# Patient Record
Sex: FEMALE | Race: WHITE | NOT HISPANIC OR LATINO | Employment: OTHER | URBAN - METROPOLITAN AREA
[De-identification: names, ages, dates, MRNs, and addresses within clinical notes are randomized per-mention and may not be internally consistent; named-entity substitution may affect disease eponyms.]

---

## 2023-03-24 ENCOUNTER — TELEMEDICINE (OUTPATIENT)
Dept: PRIMARY CARE | Facility: CLINIC | Age: 83
End: 2023-03-24
Payer: MEDICARE

## 2023-03-24 DIAGNOSIS — U07.1 COVID-19: Primary | ICD-10-CM

## 2023-03-24 PROBLEM — E66.9 CLASS 1 OBESITY WITH BODY MASS INDEX (BMI) OF 31.0 TO 31.9 IN ADULT: Status: ACTIVE | Noted: 2023-03-24

## 2023-03-24 PROBLEM — E03.9 HYPOTHYROIDISM: Status: ACTIVE | Noted: 2023-03-24

## 2023-03-24 PROBLEM — K21.9 GERD (GASTROESOPHAGEAL REFLUX DISEASE): Status: ACTIVE | Noted: 2023-03-24

## 2023-03-24 PROBLEM — F43.9 STRESS: Status: ACTIVE | Noted: 2023-03-24

## 2023-03-24 PROBLEM — F43.21 ADJUSTMENT DISORDER WITH DEPRESSED MOOD: Status: ACTIVE | Noted: 2023-03-24

## 2023-03-24 PROBLEM — M54.50 LUMBAGO: Status: ACTIVE | Noted: 2023-03-24

## 2023-03-24 PROBLEM — E66.811 CLASS 1 OBESITY WITH BODY MASS INDEX (BMI) OF 31.0 TO 31.9 IN ADULT: Status: ACTIVE | Noted: 2023-03-24

## 2023-03-24 PROBLEM — M25.561 BILATERAL KNEE PAIN: Status: ACTIVE | Noted: 2023-03-24

## 2023-03-24 PROBLEM — R42 EPISODIC LIGHTHEADEDNESS: Status: ACTIVE | Noted: 2023-03-24

## 2023-03-24 PROBLEM — R39.9 UTI SYMPTOMS: Status: ACTIVE | Noted: 2023-03-24

## 2023-03-24 PROBLEM — E86.0 DEHYDRATION: Status: ACTIVE | Noted: 2023-03-24

## 2023-03-24 PROBLEM — R39.15 URGENCY OF URINATION: Status: ACTIVE | Noted: 2023-03-24

## 2023-03-24 PROBLEM — E11.40 DIABETIC NEUROPATHY (MULTI): Status: ACTIVE | Noted: 2023-03-24

## 2023-03-24 PROBLEM — I10 HYPERTENSION: Status: ACTIVE | Noted: 2023-03-24

## 2023-03-24 PROBLEM — M19.90 OSTEOARTHRITIS: Status: ACTIVE | Noted: 2023-03-24

## 2023-03-24 PROBLEM — E78.5 HYPERLIPIDEMIA: Status: ACTIVE | Noted: 2023-03-24

## 2023-03-24 PROBLEM — N95.2 ATROPHIC VAGINITIS: Status: ACTIVE | Noted: 2023-03-24

## 2023-03-24 PROBLEM — K59.00 CONSTIPATION: Status: ACTIVE | Noted: 2023-03-24

## 2023-03-24 PROBLEM — M25.562 BILATERAL KNEE PAIN: Status: ACTIVE | Noted: 2023-03-24

## 2023-03-24 PROBLEM — M19.90 DEGENERATIVE JOINT DISEASE: Status: ACTIVE | Noted: 2023-03-24

## 2023-03-24 PROBLEM — E11.9 TYPE 2 DIABETES MELLITUS (MULTI): Status: ACTIVE | Noted: 2023-03-24

## 2023-03-24 PROBLEM — N39.0 URINARY TRACT INFECTION, CHRONIC: Status: ACTIVE | Noted: 2023-03-24

## 2023-03-24 PROBLEM — I48.91 ATRIAL FIBRILLATION (MULTI): Status: ACTIVE | Noted: 2023-03-24

## 2023-03-24 PROBLEM — N39.0 UTI (URINARY TRACT INFECTION): Status: ACTIVE | Noted: 2023-03-24

## 2023-03-24 PROCEDURE — 99213 OFFICE O/P EST LOW 20 MIN: CPT | Performed by: FAMILY MEDICINE

## 2023-03-24 RX ORDER — BROMPHENIRAMINE MALEATE, PSEUDOEPHEDRINE HYDROCHLORIDE, AND DEXTROMETHORPHAN HYDROBROMIDE 2; 30; 10 MG/5ML; MG/5ML; MG/5ML
5 SYRUP ORAL 4 TIMES DAILY PRN
Qty: 120 ML | Refills: 0 | Status: SHIPPED | OUTPATIENT
Start: 2023-03-24 | End: 2023-04-03

## 2023-03-24 RX ORDER — ATORVASTATIN CALCIUM 40 MG/1
1 TABLET, FILM COATED ORAL DAILY
COMMUNITY
Start: 2014-05-23

## 2023-03-24 RX ORDER — INSULIN LISPRO 100 [IU]/ML
20 INJECTION, SUSPENSION SUBCUTANEOUS 2 TIMES DAILY
COMMUNITY
Start: 2014-01-08

## 2023-03-24 RX ORDER — GABAPENTIN 100 MG/1
1 CAPSULE ORAL NIGHTLY
COMMUNITY
Start: 2022-04-18 | End: 2023-08-22

## 2023-03-24 RX ORDER — MOLNUPIRAVIR 200 MG/1
400 CAPSULE ORAL 2 TIMES DAILY
Qty: 40 CAPSULE | Refills: 0 | Status: SHIPPED | OUTPATIENT
Start: 2023-03-24 | End: 2023-08-22

## 2023-03-24 RX ORDER — LEVOTHYROXINE SODIUM 75 UG/1
75 TABLET ORAL DAILY
COMMUNITY
Start: 2015-12-04

## 2023-03-24 RX ORDER — CHOLECALCIFEROL (VITAMIN D3) 25 MCG
TABLET ORAL
COMMUNITY
Start: 2018-12-26

## 2023-03-24 RX ORDER — OMEPRAZOLE 20 MG/1
1 CAPSULE, DELAYED RELEASE ORAL DAILY
COMMUNITY
Start: 2013-09-17

## 2023-03-24 ASSESSMENT — ENCOUNTER SYMPTOMS
DEPRESSION: 0
OCCASIONAL FEELINGS OF UNSTEADINESS: 0
LOSS OF SENSATION IN FEET: 0

## 2023-03-24 NOTE — PROGRESS NOTES
Subjective   Patient ID: Anabel Cordova is a 83 y.o. female who presents for No chief complaint on file..  HPI  Very pleasant 83-year-old with history of diabetes hyperlipidemia and DVT atrial fibrillation here today virtually seen virtually today for COVID-19 she tested positive this morning her symptoms started 2 days ago she has a severe cough no fever she is able to speak without feeling short of breath she has multiple medical issues her family was concerned as she has not had a fever chills has not had body aches did have vomit this morning I had a little bit of diarrhea little bit of body aches for the most part when she rests she feels okay  Review of Systems  Constitutional: no chills, no fever and no night sweats.   Eyes: no blurred vision and no eyesight problems.   ENT: no hearing loss, no nasal congestion, no nasal discharge, no hoarseness and no sore throat.   Cardiovascular: no chest pain, no intermittent leg claudication, no lower extremity edema, no palpitations and no syncope.   Respiratory: no cough, no shortness of breath during exertion, no shortness of breath at rest and no wheezing.   Gastrointestinal: no abdominal pain, no blood in stools, no constipation, no diarrhea, no melena, no nausea, no rectal pain and no vomiting.   Genitourinary: no dysuria, no change in urinary frequency, no urinary hesitancy, no feelings of urinary urgency and no vaginal discharge.   Musculoskeletal: no arthralgias,  no back pain and no myalgias.   Integumentary: no new skin lesions and no rashes.   Neurological: no difficulty walking, no headache, no limb weakness, no numbness and no tingling.   Psychiatric: no anxiety, no depression, no anhedonia and no substance use disorders.   Endocrine: no recent weight gain and no recent weight loss.   Hematologic/Lymphatic: no tendency for easy bruising and no swollen glands .  Objective    There were no vitals taken for this visit.   Physical Exam  The patient  appears well and is in no acute distress  There is no apparent accessory muscle use does not appear to have any retractions  No conversational dyspnea she does have a slight cough she is able to take deep breaths and she in general appears well and is smiling  Assessment/Plan   Problem List Items Addressed This Visit    None  Visit Diagnoses       COVID-19    -  Primary    Relevant Medications    nirmatrelvir-ritonavir (PAXLOVID) 150-100 mg tablet therapy pack    brompheniramine-pseudoeph-DM 2-30-10 mg/5 mL syrup            COVID-19  Paxlovid use cautiously hold on statin and temporarily hold Eliquis for 5 days hold statin for 10  Fluids rest symptomatic relief  Bromfed for cough  Close observation for foot and follow-up if symptoms worsen please follow-up      Nathalie Buchanan MD

## 2023-03-24 NOTE — PATIENT INSTRUCTIONS
You have COVID I am putting you on an antiviral that is experimental it should help shorten your symptoms and keep you out of the hospital but you have to avoid certain medications during the course of your therapy please do not take your statin and do not take the Eliquis for a few days 5 days  Please watch closely your symptoms if they worsen please let me know immediately  Please watch for worsening fever and declining respiratory status  Please follow-up in office if you are not better  Please schedule annual Medicare wellness exam

## 2023-03-28 ENCOUNTER — TELEPHONE (OUTPATIENT)
Dept: PRIMARY CARE | Facility: CLINIC | Age: 83
End: 2023-03-28
Payer: MEDICARE

## 2023-03-28 NOTE — TELEPHONE ENCOUNTER
Pt daughter raquel called and said that she started paxlovid on Saturday and took pepdo for stomach pain Sunday and did a double dose, she had dark stool on Monday and still today and was wondering if that should be concerning, please advice to raquel at [phone]

## 2023-03-29 ENCOUNTER — TELEPHONE (OUTPATIENT)
Dept: PRIMARY CARE | Facility: CLINIC | Age: 83
End: 2023-03-29
Payer: MEDICARE

## 2023-04-01 LAB
NON-UH HIE BASO COUNT: 0.01 X1000 (ref 0–0.2)
NON-UH HIE BASOS %: 0.2 %
NON-UH HIE DIFF?: NO
NON-UH HIE DXH ACTIONS: ABNORMAL
NON-UH HIE EOS COUNT: 0.02 X1000 (ref 0–0.5)
NON-UH HIE EOSIN %: 0.4 %
NON-UH HIE HCT: 40.5 % (ref 36–46)
NON-UH HIE HGB: 13.4 G/DL (ref 12–16)
NON-UH HIE INSTR WBC: 4.6
NON-UH HIE LYMPH %: 38 %
NON-UH HIE LYMPH COUNT: 1.75 X1000 (ref 1.2–4.8)
NON-UH HIE MCH: 29.9 PG (ref 27–34)
NON-UH HIE MCHC: 33.2 G/DL (ref 32–37)
NON-UH HIE MCV: 90.2 FL (ref 80–100)
NON-UH HIE MONO %: 6.2 %
NON-UH HIE MONO COUNT: 0.29 X1000 (ref 0.1–1)
NON-UH HIE MPV: 7.1 FL (ref 7.4–10.4)
NON-UH HIE NEUTROPHIL %: 55.3 %
NON-UH HIE NEUTROPHIL COUNT (ANC): 2.54 X1000 (ref 1.4–8.8)
NON-UH HIE NUCLEATED RBC: 0 /100WBC
NON-UH HIE PLATELET: 248 X10 (ref 150–450)
NON-UH HIE RBC: 4.49 X10 (ref 4.2–5.4)
NON-UH HIE RDW: 12.8 % (ref 11.5–14.5)
NON-UH HIE WBC: 4.6 X10 (ref 4.5–11)

## 2023-05-22 ENCOUNTER — OFFICE VISIT (OUTPATIENT)
Dept: PRIMARY CARE | Facility: CLINIC | Age: 83
End: 2023-05-22
Payer: MEDICARE

## 2023-05-22 DIAGNOSIS — E11.42 DIABETIC POLYNEUROPATHY ASSOCIATED WITH TYPE 2 DIABETES MELLITUS (MULTI): ICD-10-CM

## 2023-05-22 DIAGNOSIS — E66.9 CLASS 1 OBESITY WITH SERIOUS COMORBIDITY AND BODY MASS INDEX (BMI) OF 31.0 TO 31.9 IN ADULT, UNSPECIFIED OBESITY TYPE: ICD-10-CM

## 2023-05-22 DIAGNOSIS — Z00.00 MEDICARE ANNUAL WELLNESS VISIT, SUBSEQUENT: Primary | ICD-10-CM

## 2023-05-22 DIAGNOSIS — E11.9 TYPE 2 DIABETES MELLITUS WITHOUT COMPLICATION, WITHOUT LONG-TERM CURRENT USE OF INSULIN (MULTI): ICD-10-CM

## 2023-05-22 DIAGNOSIS — H60.93 OTITIS EXTERNA OF BOTH EARS, UNSPECIFIED CHRONICITY, UNSPECIFIED TYPE: ICD-10-CM

## 2023-05-22 DIAGNOSIS — J44.9 CHRONIC OBSTRUCTIVE PULMONARY DISEASE, UNSPECIFIED COPD TYPE (MULTI): ICD-10-CM

## 2023-05-22 DIAGNOSIS — D68.59 ANTITHROMBIN DEFICIENCY (MULTI): ICD-10-CM

## 2023-05-22 DIAGNOSIS — I20.89 ANGINA DECUBITUS (CMS-HCC): ICD-10-CM

## 2023-05-22 DIAGNOSIS — I48.11 LONGSTANDING PERSISTENT ATRIAL FIBRILLATION (MULTI): ICD-10-CM

## 2023-05-22 PROCEDURE — 3075F SYST BP GE 130 - 139MM HG: CPT | Performed by: FAMILY MEDICINE

## 2023-05-22 PROCEDURE — 1170F FXNL STATUS ASSESSED: CPT | Performed by: FAMILY MEDICINE

## 2023-05-22 PROCEDURE — 99213 OFFICE O/P EST LOW 20 MIN: CPT | Performed by: FAMILY MEDICINE

## 2023-05-22 PROCEDURE — 1159F MED LIST DOCD IN RCRD: CPT | Performed by: FAMILY MEDICINE

## 2023-05-22 PROCEDURE — 1160F RVW MEDS BY RX/DR IN RCRD: CPT | Performed by: FAMILY MEDICINE

## 2023-05-22 PROCEDURE — G0439 PPPS, SUBSEQ VISIT: HCPCS | Performed by: FAMILY MEDICINE

## 2023-05-22 PROCEDURE — 1036F TOBACCO NON-USER: CPT | Performed by: FAMILY MEDICINE

## 2023-05-22 PROCEDURE — 3078F DIAST BP <80 MM HG: CPT | Performed by: FAMILY MEDICINE

## 2023-05-22 RX ORDER — NEOMYCIN SULFATE, POLYMYXIN B SULFATE, HYDROCORTISONE 3.5; 10000; 1 MG/ML; [USP'U]/ML; MG/ML
2 SOLUTION/ DROPS AURICULAR (OTIC) 4 TIMES DAILY
Qty: 10 ML | Refills: 0 | Status: SHIPPED | OUTPATIENT
Start: 2023-05-22 | End: 2023-05-29

## 2023-05-22 ASSESSMENT — ACTIVITIES OF DAILY LIVING (ADL)
MANAGING_FINANCES: INDEPENDENT
BATHING: INDEPENDENT
DRESSING: INDEPENDENT
GROCERY_SHOPPING: NEEDS ASSISTANCE
TAKING_MEDICATION: INDEPENDENT
DOING_HOUSEWORK: INDEPENDENT

## 2023-05-22 ASSESSMENT — PATIENT HEALTH QUESTIONNAIRE - PHQ9
1. LITTLE INTEREST OR PLEASURE IN DOING THINGS: NOT AT ALL
SUM OF ALL RESPONSES TO PHQ9 QUESTIONS 1 AND 2: 0
1. LITTLE INTEREST OR PLEASURE IN DOING THINGS: NOT AT ALL
2. FEELING DOWN, DEPRESSED OR HOPELESS: NOT AT ALL
SUM OF ALL RESPONSES TO PHQ9 QUESTIONS 1 AND 2: 0
2. FEELING DOWN, DEPRESSED OR HOPELESS: NOT AT ALL

## 2023-05-22 NOTE — PROGRESS NOTES
Subjective   Reason for Visit: Anabel Cordova is an 83 y.o. female here for a Medicare Wellness visit.     Past Medical, Surgical, and Family History reviewed and updated in chart.    Reviewed all medications by prescribing practitioner or clinical pharmacist (such as prescriptions, OTCs, herbal therapies and supplements) and documented in the medical record.    Very pleasant 83-year-old here today with a feeling of blockage in both of her ears  Its been bothering her for several weeks and getting worse  She has tried some home remedies with no improvement  No upper respiratory symptoms sinus congestion cough sore throat postnasal drip or fever but she has had chronic congestion  No difficulty breathing  Does have diabetes sees her endocrinologist regularly  The ear pain has been going on for a week or 2 but she has noticed some decreased hearing acuity as well and she normally hears quite well  She is also here for her annual Medicare wellness exam        Patient Self Assessment of Health Status  Patient Self Assessment: Good    Nutrition and Exercise  Current Diet: Diabetic Diet  Adequate Fluid Intake: Yes  Caffeine: Yes  Exercise Frequency: Infrequently    Functional Ability/Level of Safety  Cognitive Impairment Observed: No cognitive impairment observed  Cognitive Impairment Reported: No cognitive impairment reported by patient or family    Home Safety Risk Factors: None         Patient Care Team:  Nathalie Buchanan MD as PCP - General  Nathalie Buchanan MD as PCP - [employer] PCP     Review of Systems  Constitutional: no chills, no fever and no night sweats.   Eyes: no blurred vision and no eyesight problems.   ENT: no hearing loss, no nasal congestion, no nasal discharge, no hoarseness and no sore throat.   Cardiovascular: no chest pain, no intermittent leg claudication, no lower extremity edema, no palpitations and no syncope.   Respiratory: no cough, no shortness of breath during  exertion, no shortness of breath at rest and no wheezing.   Gastrointestinal: no abdominal pain, no blood in stools, no constipation, no diarrhea, no melena, no nausea, no rectal pain and no vomiting.   Genitourinary: no dysuria, no change in urinary frequency, no urinary hesitancy, no feelings of urinary urgency and no vaginal discharge.   Musculoskeletal: no arthralgias,  no back pain and no myalgias.   Integumentary: no new skin lesions and no rashes.   Neurological: no difficulty walking, no headache, no limb weakness, no numbness and no tingling.   Psychiatric: no anxiety, no depression, no anhedonia and no substance use disorders.   Endocrine: no recent weight gain and no recent weight loss.   Hematologic/Lymphatic: no tendency for easy bruising and no swollen glands .    Medicare Wellness Visit Billing Compliance Not Met      Objective   Vitals:  /70   Pulse 66   Temp 36.3 °C (97.3 °F)   Ht 1.524 m (5')   Wt 73.9 kg (163 lb)   BMI 31.83 kg/m²       Physical Exam  The patient appeared well nourished and normally developed. Vital signs as documented. Head exam is unremarkable. No scleral icterus or corneal arcus noted.  Pupils are equal round reactive to light extraocular movements are intact no hemorrhages noted on funduscopic exam mouth mucous membranes are moist no exudates ears canals clear TMs are gray pearly not injected nose no rhinorrhea or epistaxis Neck is without jugular venous distension, thyromegaly, or carotid bruits. Carotid upstrokes are brisk bilaterally. Lungs are clear to auscultation and percussion. Cardiac exam reveals the PMI to be normally sized and situated. Rhythm is regular. First and second heart sounds normal. No murmurs, rubs or gallops. Abdominal exam reveals normal bowel sounds, no masses, no organomegaly and no aortic enlargement. Extremities are nonedematous and both femoral and pedal pulses are normal.  Neurologic exam DTRs are equal bilaterally no focal deficits  strength is symmetrical heme lymph no palpable lymph nodes in the neck axilla or groin  Degenerative changes in the joints irritation in the ear canals  Assessment/Plan   Problem List Items Addressed This Visit          Nervous    Diabetic neuropathy (CMS/[facility])    Current Assessment & Plan     Stable based on symptoms and exam  No issues  Continue current medication management            Respiratory    Chronic obstructive pulmonary disease, unspecified COPD type (CMS/[facility])    Current Assessment & Plan     Stable based on symptoms and exam  Continue to monitor annually            Circulatory    Atrial fibrillation (CMS/[facility])    Current Assessment & Plan     Stable based on symptoms and exam  Continue to follow regularly  No issues currently         Angina decubitus (CMS/HCC)    Current Assessment & Plan     Stable based on symptoms and exam  Continue to follow-up with cardiology            Endocrine/Metabolic    Type 2 diabetes mellitus (CMS/HCC)    Current Assessment & Plan     Stable based on symptoms and exam  Continue to follow with endocrinologist         Class 1 obesity with body mass index (BMI) of 31.0 to 31.9 in adult    Current Assessment & Plan     Above normal BMI  Nutrition and physical activity reviewed            Hematologic    Antithrombin deficiency (CMS/[facility])    Current Assessment & Plan     Stable based on symptoms and exam  Continue to monitor annually            Infectious/Inflammatory    Otitis externa of both ears    Current Assessment & Plan     Irritation of both ear canals  Use the eardrops and follow-up if symptoms do not improve            Other    Medicare annual wellness visit, subsequent - Primary    Current Assessment & Plan     Continue annual exams

## 2023-05-30 VITALS
DIASTOLIC BLOOD PRESSURE: 70 MMHG | HEIGHT: 60 IN | BODY MASS INDEX: 32 KG/M2 | WEIGHT: 163 LBS | SYSTOLIC BLOOD PRESSURE: 137 MMHG | TEMPERATURE: 97.3 F | HEART RATE: 66 BPM

## 2023-05-30 PROBLEM — H60.93 OTITIS EXTERNA OF BOTH EARS: Status: ACTIVE | Noted: 2023-05-30

## 2023-05-30 PROBLEM — E86.0 DEHYDRATION: Status: RESOLVED | Noted: 2023-03-24 | Resolved: 2023-05-30

## 2023-05-30 PROBLEM — I20.89 ANGINA DECUBITUS (CMS-HCC): Status: ACTIVE | Noted: 2023-05-30

## 2023-05-30 PROBLEM — J44.9 CHRONIC OBSTRUCTIVE PULMONARY DISEASE, UNSPECIFIED COPD TYPE (MULTI): Status: ACTIVE | Noted: 2023-05-30

## 2023-05-30 PROBLEM — Z00.00 MEDICARE ANNUAL WELLNESS VISIT, SUBSEQUENT: Status: ACTIVE | Noted: 2023-05-30

## 2023-05-30 PROBLEM — D68.52 PROTHROMBIN MUTATION (MULTI): Status: ACTIVE | Noted: 2023-05-30

## 2023-05-30 PROBLEM — D68.59: Status: ACTIVE | Noted: 2023-05-30

## 2023-05-30 PROBLEM — R76.0 POSITIVE CARDIOLIPIN ANTIBODIES: Status: ACTIVE | Noted: 2023-05-30

## 2023-05-30 PROBLEM — I25.10 CAD (CORONARY ARTERY DISEASE): Status: ACTIVE | Noted: 2023-05-30

## 2023-05-30 PROBLEM — U07.1 COVID-19: Status: RESOLVED | Noted: 2023-03-24 | Resolved: 2023-05-30

## 2023-05-30 PROBLEM — M96.1 LUMBAR POSTLAMINECTOMY SYNDROME: Status: ACTIVE | Noted: 2023-05-30

## 2023-05-30 PROBLEM — F43.21 ADJUSTMENT DISORDER WITH DEPRESSED MOOD: Status: RESOLVED | Noted: 2023-03-24 | Resolved: 2023-05-30

## 2023-05-30 PROBLEM — I83.10 VARICOSE VEINS OF LOWER EXTREMITY WITH INFLAMMATION: Status: ACTIVE | Noted: 2018-03-23

## 2023-05-30 PROBLEM — R39.15 URGENCY OF URINATION: Status: RESOLVED | Noted: 2023-03-24 | Resolved: 2023-05-30

## 2023-05-30 NOTE — PATIENT INSTRUCTIONS
Today you had annual Medicare wellness exam  Continue these exams yearly  You were seen today for irritation in your ears which appears to be and ear canal infection  I am prescribing drops for you to use  Please follow-up if your symptoms do not improve and please use Debrox going forward to avoid earwax accumulation  Your ears were blocked with cerumen today

## 2023-08-10 ENCOUNTER — PATIENT OUTREACH (OUTPATIENT)
Dept: CARE COORDINATION | Facility: CLINIC | Age: 83
End: 2023-08-10
Payer: MEDICARE

## 2023-08-10 DIAGNOSIS — E87.8 HYPOCHLOREMIA: ICD-10-CM

## 2023-08-10 DIAGNOSIS — R53.1 GENERALIZED WEAKNESS: ICD-10-CM

## 2023-08-10 DIAGNOSIS — Z60.2 LIVES ALONE: ICD-10-CM

## 2023-08-10 RX ORDER — INSULIN LISPRO 100 [IU]/ML
INJECTION, SOLUTION INTRAVENOUS; SUBCUTANEOUS
COMMUNITY

## 2023-08-10 SDOH — SOCIAL STABILITY - SOCIAL INSECURITY: PROBLEMS RELATED TO LIVING ALONE: Z60.2

## 2023-08-10 NOTE — PROGRESS NOTES
Discharge Facility:[facility]  Discharge Diagnosis:R53.1 General weakness, E87.8 Hypochloremia, Z60.2 Lives alone  Admission Date:8/6/23  Discharge Date: 8/9/23    PCP Appointment Date:8/23/23  Specialist Appointment Date:   Hospital Encounter and Summary: not available at this time   See discharge assessment below for further details    Engagement  Call Start Time: 0850 (8/10/2023  8:46 AM)    Medications  Medications reviewed with patient/caregiver?: Yes (8/10/2023  8:46 AM)  Is the patient having any side effects they believe may be caused by any medication additions or changes?: No (8/10/2023  8:46 AM)  Does the patient have all medications ordered at discharge?: Yes (8/10/2023  8:46 AM)  Care Management Interventions: Provided patient education (8/10/2023  8:46 AM)  Is the patient taking all medications as directed (includes completed medication regime)?: Yes (8/10/2023  8:46 AM)  Care Management Interventions: Provided patient education (8/10/2023  8:46 AM)  Medication Comments: No new medication. Change with Humalog. Sliding scale added. (8/10/2023  8:46 AM)    Appointments  Does the patient have a primary care provider?: Yes (8/10/2023  8:46 AM)  Care Management Interventions: Verified appointment date/time/provider (8/10/2023  8:46 AM)  Has the patient kept scheduled appointments due by today?: Yes (8/10/2023  8:46 AM)  Care Management Interventions: Advised patient to keep appointment; Educated on importance of keeping appointment (8/10/2023  8:46 AM)    Self Management  What is the home health agency?: [facility] (8/10/2023  8:46 AM)  Has home health visited the patient within 72 hours of discharge?: Call prior to 72 hours (8/10/2023  8:46 AM)    Patient Teaching  Does the patient have access to their discharge instructions?: Yes (8/10/2023  8:46 AM)  Care Management Interventions: Reviewed instructions with patient (8/10/2023  8:46 AM)  What is the patient's perception of their health status  since discharge?: Improving (8/10/2023  8:46 AM)  Is the patient/caregiver able to teach back the hierarchy of who to call/visit for symptoms/problems? PCP, Specialist, Home Health nurse, Urgent Care, ED, 911: Yes (8/10/2023  8:46 AM)

## 2023-08-16 ENCOUNTER — APPOINTMENT (OUTPATIENT)
Dept: PRIMARY CARE | Facility: CLINIC | Age: 83
End: 2023-08-16
Payer: MEDICARE

## 2023-08-22 ENCOUNTER — OFFICE VISIT (OUTPATIENT)
Dept: PRIMARY CARE | Facility: CLINIC | Age: 83
End: 2023-08-22
Payer: MEDICARE

## 2023-08-22 ENCOUNTER — PATIENT OUTREACH (OUTPATIENT)
Dept: CARE COORDINATION | Facility: CLINIC | Age: 83
End: 2023-08-22

## 2023-08-22 VITALS
TEMPERATURE: 97.1 F | BODY MASS INDEX: 31.25 KG/M2 | SYSTOLIC BLOOD PRESSURE: 136 MMHG | DIASTOLIC BLOOD PRESSURE: 73 MMHG | WEIGHT: 160 LBS | HEART RATE: 59 BPM

## 2023-08-22 DIAGNOSIS — R42 EPISODIC LIGHTHEADEDNESS: ICD-10-CM

## 2023-08-22 DIAGNOSIS — E87.1 HYPONATREMIA: ICD-10-CM

## 2023-08-22 DIAGNOSIS — Z09 HOSPITAL DISCHARGE FOLLOW-UP: ICD-10-CM

## 2023-08-22 DIAGNOSIS — Z12.31 ENCOUNTER FOR SCREENING MAMMOGRAM FOR BREAST CANCER: Primary | ICD-10-CM

## 2023-08-22 PROCEDURE — 1125F AMNT PAIN NOTED PAIN PRSNT: CPT | Performed by: FAMILY MEDICINE

## 2023-08-22 PROCEDURE — 1036F TOBACCO NON-USER: CPT | Performed by: FAMILY MEDICINE

## 2023-08-22 PROCEDURE — 3075F SYST BP GE 130 - 139MM HG: CPT | Performed by: FAMILY MEDICINE

## 2023-08-22 PROCEDURE — 1159F MED LIST DOCD IN RCRD: CPT | Performed by: FAMILY MEDICINE

## 2023-08-22 PROCEDURE — 99495 TRANSJ CARE MGMT MOD F2F 14D: CPT | Performed by: FAMILY MEDICINE

## 2023-08-22 PROCEDURE — 1160F RVW MEDS BY RX/DR IN RCRD: CPT | Performed by: FAMILY MEDICINE

## 2023-08-22 PROCEDURE — 3078F DIAST BP <80 MM HG: CPT | Performed by: FAMILY MEDICINE

## 2023-08-22 RX ORDER — ASPIRIN 81 MG/1
81 TABLET ORAL DAILY
COMMUNITY

## 2023-08-22 RX ORDER — TRIMETHOPRIM 100 MG/1
100 TABLET ORAL
COMMUNITY
Start: 2023-06-12

## 2023-08-22 RX ORDER — LISINOPRIL 20 MG/1
20 TABLET ORAL DAILY
COMMUNITY
Start: 2023-06-15

## 2023-08-22 ASSESSMENT — PATIENT HEALTH QUESTIONNAIRE - PHQ9
1. LITTLE INTEREST OR PLEASURE IN DOING THINGS: NOT AT ALL
2. FEELING DOWN, DEPRESSED OR HOPELESS: NOT AT ALL
SUM OF ALL RESPONSES TO PHQ9 QUESTIONS 1 AND 2: 0

## 2023-08-22 NOTE — PROGRESS NOTES
mammSubjective   Patient ID: Anabel Cordova is a 83 y.o. female who presents for Follow-up (On hospital discharge for low sodium. ).  Very pleasant 83-year-old with history of diabetes hypertension hyperlipidemia and hypothyroidism here today for hospital follow-up  Was admitted August 7 and discharged August 10 after hospitalization for generalized weakness lightheadedness and hypertension  She had  been experiencing several days of lightheadedness that seem to be getting worse and her family became worried there was no slurred speech or localized weakness and no facial droop or lateralizing weakness she became more lightheaded family took her to the ER they also reported that there were 2 ticks that were removed by the patient very quickly and there were no rashes or joint pain  Her family became concerned by the lightheadedness took her to the ED she was evaluated in the emergency room was found to be hypertensive as well as complaining of persistent lightheadedness  Work-up for anything acute was completed there was no evidence of TIA or stroke or acute MI  She was found to have hyponatremia and that was corrected with IV fluids  She was discharged to an extended care facility for rehab due to her generalized weakness and is here today for follow-up        Review of Systems  Constitutional: no chills, no fever and no night sweats.   Eyes: no blurred vision and no eyesight problems.   ENT: no hearing loss, no nasal congestion, no nasal discharge, no hoarseness and no sore throat.   Cardiovascular: no chest pain, no intermittent leg claudication, no lower extremity edema, no palpitations and no syncope.   Respiratory: no cough, no shortness of breath during exertion, no shortness of breath at rest and no wheezing.   Gastrointestinal: no abdominal pain, no blood in stools, no constipation, no diarrhea, no melena, no nausea, no rectal pain and no vomiting.   Genitourinary: no dysuria, no change in urinary  frequency, no urinary hesitancy, no feelings of urinary urgency and no vaginal discharge.   Musculoskeletal: no arthralgias,  no back pain and no myalgias.   Integumentary: no new skin lesions and no rashes.   Neurological: no difficulty walking, no headache, no limb weakness, no numbness and no tingling.   Psychiatric: no anxiety, no depression, no anhedonia and no substance use disorders.   Endocrine: no recent weight gain and no recent weight loss.   Hematologic/Lymphatic: no tendency for easy bruising and no swollen glands .    Objective    /73   Pulse 59   Temp 36.2 °C (97.1 °F)   Wt 72.6 kg (160 lb)   BMI 31.25 kg/m²    Physical Exam  The patient appeared well nourished and normally developed. Vital signs as documented. Head exam is unremarkable. No scleral icterus or corneal arcus noted.  Pupils are equal round reactive to light extraocular movements are intact no hemorrhages noted on funduscopic exam mouth mucous membranes are moist no exudates ears canals clear TMs are gray pearly not injected nose no rhinorrhea or epistaxis Neck is without jugular venous distension, thyromegaly, or carotid bruits. Carotid upstrokes are brisk bilaterally. Lungs are clear to auscultation and percussion. Cardiac exam reveals the PMI to be normally sized and situated. Rhythm is regular. First and second heart sounds normal. No murmurs, rubs or gallops. Abdominal exam reveals normal bowel sounds, no masses, no organomegaly and no aortic enlargement. Extremities are nonedematous and both femoral and pedal pulses are normal.  Neurologic exam DTRs are equal bilaterally no focal deficits strength is symmetrical heme lymph no palpable lymph nodes in the neck axilla or groin    Assessment/Plan   Problem List Items Addressed This Visit       Episodic lightheadedness     Chronic  Decreasing frequency of episodes  Check sodium level  Maintain hydration and adequate protein intake  Continue to monitor blood sugar          Hyponatremia    Relevant Orders    Sodium    Hospital discharge follow-up     Follow-up hospital discharge for lightheadedness  Symptoms are improving  Recommend continued physical therapy rehab  Check sodium level  Follow-up in 2 months          Other Visit Diagnoses       Encounter for screening mammogram for breast cancer    -  Primary    Relevant Orders    BI mammo bilateral screening tomosynthesis                 Nathalie Buchanan MD

## 2023-08-22 NOTE — PROGRESS NOTES
Call regarding recent hospitalization.  At time of outreach call the patient feels as if their condition has improved.  No any questions or concerns.

## 2023-08-23 ENCOUNTER — APPOINTMENT (OUTPATIENT)
Dept: PRIMARY CARE | Facility: CLINIC | Age: 83
End: 2023-08-23
Payer: MEDICARE

## 2023-09-12 PROBLEM — Z96.651 PRESENCE OF RIGHT ARTIFICIAL KNEE JOINT: Status: ACTIVE | Noted: 2019-11-04

## 2023-09-12 PROBLEM — Z86.19 PERSONAL HISTORY OF OTHER INFECTIOUS AND PARASITIC DISEASES: Status: RESOLVED | Noted: 2019-11-04 | Resolved: 2023-09-12

## 2023-09-12 PROBLEM — H90.3 SENSORINEURAL HEARING LOSS, BILATERAL: Status: ACTIVE | Noted: 2019-11-04

## 2023-09-12 PROBLEM — E04.2 NONTOXIC MULTINODULAR GOITER: Status: ACTIVE | Noted: 2019-11-04

## 2023-09-12 PROBLEM — Z09 HOSPITAL DISCHARGE FOLLOW-UP: Status: ACTIVE | Noted: 2023-09-12

## 2023-09-12 PROBLEM — R26.2 DIFFICULTY IN WALKING, NOT ELSEWHERE CLASSIFIED: Status: ACTIVE | Noted: 2019-11-04

## 2023-09-12 PROBLEM — D62 ACUTE POSTHEMORRHAGIC ANEMIA: Status: RESOLVED | Noted: 2019-11-04 | Resolved: 2023-09-12

## 2023-09-12 PROBLEM — M62.81 MUSCLE WEAKNESS (GENERALIZED): Status: ACTIVE | Noted: 2019-11-04

## 2023-09-12 PROBLEM — E87.1 HYPONATREMIA: Status: ACTIVE | Noted: 2023-09-12

## 2023-09-12 PROBLEM — M20.12 ACQUIRED HALLUX VALGUS OF LEFT FOOT: Status: ACTIVE | Noted: 2018-03-23

## 2023-09-12 PROBLEM — R30.0 DYSURIA: Status: RESOLVED | Noted: 2019-11-04 | Resolved: 2023-09-12

## 2023-09-12 PROBLEM — K44.9 DIAPHRAGMATIC HERNIA WITHOUT OBSTRUCTION OR GANGRENE: Status: ACTIVE | Noted: 2019-11-04

## 2023-09-13 NOTE — ASSESSMENT & PLAN NOTE
Chronic  Decreasing frequency of episodes  Check sodium level  Maintain hydration and adequate protein intake  Continue to monitor blood sugar

## 2023-09-13 NOTE — ASSESSMENT & PLAN NOTE
Follow-up hospital discharge for lightheadedness  Symptoms are improving  Recommend continued physical therapy rehab  Check sodium level  Follow-up in 2 months

## 2023-09-13 NOTE — PATIENT INSTRUCTIONS
There is no evidence of a stroke on today's exam I recommend repeating the sodium level  I would like you to continue physical therapy for strengthening make sure you are hydrating well and getting enough protein  Follow-up with me in 2 months

## 2023-10-05 ENCOUNTER — PATIENT OUTREACH (OUTPATIENT)
Dept: CARE COORDINATION | Facility: CLINIC | Age: 83
End: 2023-10-05
Payer: MEDICARE

## 2023-11-03 ENCOUNTER — PATIENT OUTREACH (OUTPATIENT)
Dept: CARE COORDINATION | Facility: CLINIC | Age: 83
End: 2023-11-03
Payer: MEDICARE

## 2023-11-03 NOTE — PROGRESS NOTES
Call placed regarding 90 day  post discharge follow up call.  At time of outreach call the patient feels as if their condition has improved since initial visit with PCP or specialist. Questions or concerns regarding recovery period addressed at this time. Reviewed any PCP or specialists progress notes/labs/radiology reports if applicable and addressed any questions or concerns.

## 2023-12-04 ENCOUNTER — TELEMEDICINE (OUTPATIENT)
Dept: PRIMARY CARE | Facility: CLINIC | Age: 83
End: 2023-12-04
Payer: MEDICARE

## 2023-12-04 DIAGNOSIS — R68.89 FLU-LIKE SYMPTOMS: Primary | ICD-10-CM

## 2023-12-04 PROCEDURE — 99213 OFFICE O/P EST LOW 20 MIN: CPT | Performed by: FAMILY MEDICINE

## 2023-12-04 RX ORDER — AMOXICILLIN AND CLAVULANATE POTASSIUM 875; 125 MG/1; MG/1
875 TABLET, FILM COATED ORAL 2 TIMES DAILY
Qty: 20 TABLET | Refills: 0 | Status: SHIPPED | OUTPATIENT
Start: 2023-12-04 | End: 2023-12-14

## 2023-12-04 NOTE — PROGRESS NOTES
Subjective   Patient ID: Anabel Cordvoa is a 83 y.o. female who presents for No chief complaint on file..  Very pleasant 83-year-old seen virtually due to flulike symptoms  Symptoms have progressed over the course of a week getting worse  Congestion malaise lethargy  No neck stiffness or mental status changes  Cough and congestion and fullness in the ears  Not in any distress according to her daughter who is seen with her  No difficulty breathing no dyspnea dyspnea on exertion able to walk across the room without getting short of breath  No nausea or vomiting but does have decreased appetite        Review of Systems  Constitutional: no chills, no fever and no night sweats.   Eyes: no blurred vision and no eyesight problems.   ENT: no hearing loss, no nasal congestion, no nasal discharge, no hoarseness and no sore throat.   Cardiovascular: no chest pain, no intermittent leg claudication, no lower extremity edema, no palpitations and no syncope.   Respiratory: no cough, no shortness of breath during exertion, no shortness of breath at rest and no wheezing.   Gastrointestinal: no abdominal pain, no blood in stools, no constipation, no diarrhea, no melena, no nausea, no rectal pain and no vomiting.   Genitourinary: no dysuria, no change in urinary frequency, no urinary hesitancy, no feelings of urinary urgency and no vaginal discharge.   Musculoskeletal: no arthralgias,  no back pain and no myalgias.   Integumentary: no new skin lesions and no rashes.   Neurological: no difficulty walking, no headache, no limb weakness, no numbness and no tingling.   Psychiatric: no anxiety, no depression, no anhedonia and no substance use disorders.   Endocrine: no recent weight gain and no recent weight loss.   Hematologic/Lymphatic: no tendency for easy bruising and no swollen glands .    Objective    There were no vitals taken for this visit.   Physical Exam  Patient is nontoxic-appearing on virtual visit  Appears  comfortable  No apparent accessory muscle or retractions  No conversational dyspnea  Assessment/Plan   Problem List Items Addressed This Visit       Flu-like symptoms - Primary     Home covid test is negative  Fluids supportive care discussed   follow-up in office if symptoms do not improve         Relevant Medications    amoxicillin-pot clavulanate (Augmentin) 875-125 mg tablet            Nathalie Buchanan MD

## 2023-12-05 PROBLEM — R68.89 FLU-LIKE SYMPTOMS: Status: ACTIVE | Noted: 2023-12-05

## 2023-12-05 NOTE — ASSESSMENT & PLAN NOTE
Home covid test is negative  Fluids supportive care discussed   follow-up in office if symptoms do not improve

## 2024-06-18 ENCOUNTER — TELEPHONE (OUTPATIENT)
Dept: PRIMARY CARE | Facility: CLINIC | Age: 84
End: 2024-06-18
Payer: MEDICARE

## 2024-06-18 NOTE — TELEPHONE ENCOUNTER
BERNARDO [facility]     Pt was in ER [facility] is calling to Eval. And treat. Will send treatment plan for review and sig.

## 2024-06-20 ENCOUNTER — TELEPHONE (OUTPATIENT)
Dept: PRIMARY CARE | Facility: CLINIC | Age: 84
End: 2024-06-20

## 2024-06-20 NOTE — TELEPHONE ENCOUNTER
Alen from  [facility] said they are sending patient for physical therapy 2 times a week for 3 weeks.

## 2024-06-24 ENCOUNTER — APPOINTMENT (OUTPATIENT)
Dept: DERMATOLOGY | Facility: CLINIC | Age: 84
End: 2024-06-24
Payer: MEDICARE

## 2024-06-24 DIAGNOSIS — R21 RASH AND OTHER NONSPECIFIC SKIN ERUPTION: Primary | ICD-10-CM

## 2024-06-24 PROCEDURE — 1159F MED LIST DOCD IN RCRD: CPT | Performed by: NURSE PRACTITIONER

## 2024-06-24 PROCEDURE — 1036F TOBACCO NON-USER: CPT | Performed by: NURSE PRACTITIONER

## 2024-06-24 PROCEDURE — 99203 OFFICE O/P NEW LOW 30 MIN: CPT | Performed by: NURSE PRACTITIONER

## 2024-06-24 RX ORDER — CLOBETASOL PROPIONATE 0.46 MG/ML
SOLUTION TOPICAL 2 TIMES DAILY
Qty: 50 ML | Refills: 3 | Status: SHIPPED | OUTPATIENT
Start: 2024-06-24 | End: 2024-07-08

## 2024-06-24 RX ORDER — KETOCONAZOLE 20 MG/ML
SHAMPOO, SUSPENSION TOPICAL EVERY OTHER DAY
Qty: 120 ML | Refills: 2 | Status: SHIPPED | OUTPATIENT
Start: 2024-06-24

## 2024-06-24 NOTE — PROGRESS NOTES
Subjective     Anabel Cordova is a 84 y.o. female who presents for the following: Rash.   New patient in for rash to scalp for years. Patient states that the itching/burning becomes worse after her hair is colored. Patient is using mielle rosemary mint hair oil.     Review of Systems:  No other skin or systemic complaints other than what is documented elsewhere in the note.    The following portions of the chart were reviewed this encounter and updated as appropriate:       Skin Cancer History  No skin cancer on file.    Specialty Problems    None    Past Medical History:  Anabel Cordova  has a past medical history of Cramp and spasm (08/29/2016), Dysuria (11/04/2019), Encounter for screening for malignant neoplasm of colon (03/29/2021), Insect bite (nonvenomous) of unspecified part of head, initial encounter (CODE) (06/13/2020), Other general symptoms and signs (10/24/2020), Other specified postprocedural states, Pain in unspecified hip (08/16/2017), Pain in unspecified knee (04/27/2018), Pain in unspecified knee (08/16/2017), Personal history of other endocrine, nutritional and metabolic disease, Personal history of other infectious and parasitic diseases (11/04/2019), Personal history of other specified conditions (11/17/2017), and Unspecified multiple injuries, initial encounter (05/10/2017).    Past Surgical History:  Anabel Cordova  has a past surgical history that includes Other surgical history (10/21/2014); Other surgical history (02/17/2020); and Other surgical history (02/17/2020).    Family History:  Patient family history includes Colon cancer in her father; Diabetes type II in her mother; Stroke in her mother; cardiac disorder in her father.    Social History:  Anabel Cordova  reports that she has never smoked. She has never used smokeless tobacco. She reports that she does not drink alcohol and does not use drugs.    Allergies:  Ciprofloxacin, Iodinated  contrast media, Metformin, Rosiglitazone, Sulfa (sulfonamide antibiotics), and Sulfamethoxazole-trimethoprim    Current Medications / CAM's:    Current Outpatient Medications:     apixaban (Eliquis) 2.5 mg tablet, Take 1 tablet (2.5 mg) by mouth in the morning and 1 tablet (2.5 mg) before bedtime., Disp: , Rfl:     aspirin 81 mg EC tablet, Take 1 tablet (81 mg) by mouth once daily., Disp: , Rfl:     atorvastatin (Lipitor) 40 mg tablet, Take 1 tablet (40 mg) by mouth once daily. as directed, Disp: , Rfl:     cholecalciferol (Vitamin D-3) 25 MCG (1000 UT) tablet, Vitamin D 1000 UNIT TABS  Refills: 0      Start : 26-Dec-2018  Active, Disp: , Rfl:     clobetasol (Temovate) 0.05 % external solution, Apply topically 2 times a day for 14 days., Disp: 50 mL, Rfl: 3    insulin lispro (HumaLOG) 100 unit/mL injection, Inject under the skin 3 times a day with meals. Take as directed per insulin instructions.  Sliding scale, Disp: , Rfl:     insulin lispro protamin-lispro (HumaLOG Mix 75-25,U-100,Insuln) 100 unit/mL (75-25) suspension injection, Inject 20 Units under the skin in the morning and 20 Units before bedtime., Disp: , Rfl:     ketoconazole (NIZOral) 2 % shampoo, Apply topically every other day., Disp: 120 mL, Rfl: 2    levothyroxine (Synthroid, Levoxyl) 75 mcg tablet, Take 1 tablet (75 mcg) by mouth once daily. mon-sat. take 1 1/2 tablets on sun only, Disp: , Rfl:     lisinopril 20 mg tablet, Take 1 tablet (20 mg) by mouth once daily., Disp: , Rfl:     omeprazole (PriLOSEC) 20 mg DR capsule, Take 1 capsule (20 mg) by mouth once daily., Disp: , Rfl:     trimethoprim (Trimpex) 100 mg tablet, Take 1 tablet (100 mg) by mouth once daily., Disp: , Rfl:      Objective   Well appearing patient in no apparent distress; mood and affect are within normal limits.      Assessment/Plan   1. Rash and other nonspecific skin eruption  Scalp  84 year old female with a history of erythema and itching to scalp after hair dye application.   No scaling, no significant history.      PLAN:  Ddx includes seb derm vs. Irritant contact dermatitis  Start clobetasol 0.05% solution twice daily as needed  Start ketoconazole 2% shampoo 1-2 times a week    clobetasol (Temovate) 0.05 % external solution - Scalp  Apply topically 2 times a day for 14 days.    ketoconazole (NIZOral) 2 % shampoo - Scalp  Apply topically every other day.

## 2024-07-15 ENCOUNTER — TELEPHONE (OUTPATIENT)
Dept: PRIMARY CARE | Facility: CLINIC | Age: 84
End: 2024-07-15
Payer: MEDICARE

## 2024-08-12 ENCOUNTER — APPOINTMENT (OUTPATIENT)
Dept: DERMATOLOGY | Facility: CLINIC | Age: 84
End: 2024-08-12
Payer: MEDICARE

## 2024-08-21 ENCOUNTER — APPOINTMENT (OUTPATIENT)
Dept: PODIATRY | Facility: CLINIC | Age: 84
End: 2024-08-21
Payer: MEDICARE

## 2024-08-21 DIAGNOSIS — B35.1 ONYCHOMYCOSIS: ICD-10-CM

## 2024-08-21 DIAGNOSIS — M79.672 FOOT PAIN, BILATERAL: Primary | ICD-10-CM

## 2024-08-21 DIAGNOSIS — E11.49 TYPE II DIABETES MELLITUS WITH NEUROLOGICAL MANIFESTATIONS (MULTI): ICD-10-CM

## 2024-08-21 DIAGNOSIS — M79.671 FOOT PAIN, BILATERAL: Primary | ICD-10-CM

## 2024-08-21 DIAGNOSIS — M62.81 MUSCLE WEAKNESS (GENERALIZED): ICD-10-CM

## 2024-08-21 PROCEDURE — 1159F MED LIST DOCD IN RCRD: CPT | Performed by: PODIATRIST

## 2024-08-21 PROCEDURE — 99213 OFFICE O/P EST LOW 20 MIN: CPT | Performed by: PODIATRIST

## 2024-08-21 PROCEDURE — 1036F TOBACCO NON-USER: CPT | Performed by: PODIATRIST

## 2024-08-21 PROCEDURE — 1160F RVW MEDS BY RX/DR IN RCRD: CPT | Performed by: PODIATRIST

## 2024-08-21 PROCEDURE — 99203 OFFICE O/P NEW LOW 30 MIN: CPT | Performed by: PODIATRIST

## 2024-08-21 RX ORDER — DENOSUMAB 60 MG/ML
INJECTION SUBCUTANEOUS
COMMUNITY
Start: 2024-08-08

## 2024-08-21 ASSESSMENT — ENCOUNTER SYMPTOMS
HEMATOLOGIC/LYMPHATIC NEGATIVE: 1
NUMBNESS: 1
EYES NEGATIVE: 1
ALLERGIC/IMMUNOLOGIC NEGATIVE: 1
ENDOCRINE NEGATIVE: 1
RESPIRATORY NEGATIVE: 1
ROS SKIN COMMENTS: NAIL CHANGES
PSYCHIATRIC NEGATIVE: 1
CONSTITUTIONAL NEGATIVE: 1
GASTROINTESTINAL NEGATIVE: 1

## 2024-08-21 NOTE — PROGRESS NOTES
Chief Complaint   Patient presents with    DM Foot Care     New Patient is here today for diabetic foot care.  Referred by endocrinologist. Numbness and tingling present in both feet. Saw Dr. Bales in the past for treatment         Diabetic female referred here by endocrinology.  For diabetic foot check.  Nail care.-Dystrophic nails on the right foot.  Glucose is controlled.  Last A1c was 7.3.  Admit to numbness of feet as well as some weakness in the legs.  Using a cane for mobility.  She was seeing another podiatrist was using ciclopirox gel without success on the nails.    Review of Systems   Constitutional: Negative.    Eyes: Negative.    Respiratory: Negative.     Cardiovascular:  Positive for leg swelling.   Gastrointestinal: Negative.    Endocrine: Negative.    Genitourinary: Negative.    Musculoskeletal:  Positive for gait problem.   Skin:         Nail changes   Allergic/Immunologic: Negative.    Neurological:  Positive for numbness.   Hematological: Negative.    Psychiatric/Behavioral: Negative.         General/Constitutional: Alert. NAD.   Respiratory: Non labored breathing.   Psychiatric: Mood and affect normal/baseline.   HEENT: Sclera clear. Wearing corrective lenses.  Dermatologic: Right foot multiple dystrophic hypertrophic nails.  Left foot nails are fairly stable.  Painful to palpation. No acute inflammatory infectious process. Web spaces are dry. No ulcers no pre-ulcerative areas.  Vascular: Pedal pulses are intact and symmetric including the dorsalis pedis and the posterior tibial pulses. Feet are warm to touch.  Right lower extremity moderate nonpitting edema.  Multiple varicosities right lower leg.  Leg with history of DVT.   Neurological: Alert and oriented. No acute distress.  Gross sensation intact.  Monofilament testing diminished both feet.  Musculoskeletal: Mild symmetric decrease in strength both lower extremities.  Using a cane for mobility.  Cautious gait pattern.    Impression:  Painful onychomycosis.  Venous stasis.  Varicosities.  Complicated by diabetes with neuropathy.    -Nail debridement was performed this was a greater than 6 nails. Nails were manually debrided.  They were decreased and girth in length. Appropriate care was discussed. Preventative care was reviewed. Follow-up in about 2 months unless there is any other problems.    -Encourage you to wear compression socks especially on the right lower leg that would be helpful.    -Your feet daily for problems using a moisturizer wearing shoes avoiding injury.    -Keep your glucose under control follow-up with your physicians.    -Counseled patient on proper diabetic footcare.  Findings were also discussed with daughter.    -Follow-up every 2 to 3 months or as needed.    -Could consider physical therapy for gait.    -I do not think the ciclopirox will be of much benefit if you would like to use a you can do so.

## 2024-09-11 ENCOUNTER — OFFICE VISIT (OUTPATIENT)
Dept: PRIMARY CARE | Facility: CLINIC | Age: 84
End: 2024-09-11
Payer: MEDICARE

## 2024-09-11 VITALS
HEART RATE: 62 BPM | SYSTOLIC BLOOD PRESSURE: 120 MMHG | DIASTOLIC BLOOD PRESSURE: 68 MMHG | BODY MASS INDEX: 32.42 KG/M2 | OXYGEN SATURATION: 98 % | TEMPERATURE: 97.3 F | WEIGHT: 166 LBS

## 2024-09-11 DIAGNOSIS — R39.9 UTI SYMPTOMS: ICD-10-CM

## 2024-09-11 DIAGNOSIS — Z23 ENCOUNTER FOR IMMUNIZATION: ICD-10-CM

## 2024-09-11 DIAGNOSIS — Z00.00 MEDICARE ANNUAL WELLNESS VISIT, SUBSEQUENT: Primary | ICD-10-CM

## 2024-09-11 LAB
POC APPEARANCE, URINE: CLEAR
POC BILIRUBIN, URINE: NEGATIVE
POC BLOOD, URINE: ABNORMAL
POC COLOR, URINE: YELLOW
POC GLUCOSE, URINE: ABNORMAL MG/DL
POC KETONES, URINE: NEGATIVE MG/DL
POC LEUKOCYTES, URINE: ABNORMAL
POC NITRITE,URINE: NEGATIVE
POC PH, URINE: 5.5 PH
POC PROTEIN, URINE: NEGATIVE MG/DL
POC SPECIFIC GRAVITY, URINE: 1.01
POC UROBILINOGEN, URINE: 0.2 EU/DL

## 2024-09-11 PROCEDURE — G0439 PPPS, SUBSEQ VISIT: HCPCS | Performed by: FAMILY MEDICINE

## 2024-09-11 PROCEDURE — 87186 SC STD MICRODIL/AGAR DIL: CPT

## 2024-09-11 PROCEDURE — 99213 OFFICE O/P EST LOW 20 MIN: CPT | Performed by: FAMILY MEDICINE

## 2024-09-11 PROCEDURE — 90677 PCV20 VACCINE IM: CPT | Performed by: FAMILY MEDICINE

## 2024-09-11 PROCEDURE — 87086 URINE CULTURE/COLONY COUNT: CPT

## 2024-09-11 PROCEDURE — 1159F MED LIST DOCD IN RCRD: CPT | Performed by: FAMILY MEDICINE

## 2024-09-11 PROCEDURE — 3074F SYST BP LT 130 MM HG: CPT | Performed by: FAMILY MEDICINE

## 2024-09-11 PROCEDURE — 81003 URINALYSIS AUTO W/O SCOPE: CPT | Performed by: FAMILY MEDICINE

## 2024-09-11 PROCEDURE — 1170F FXNL STATUS ASSESSED: CPT | Performed by: FAMILY MEDICINE

## 2024-09-11 PROCEDURE — G0009 ADMIN PNEUMOCOCCAL VACCINE: HCPCS | Performed by: FAMILY MEDICINE

## 2024-09-11 PROCEDURE — 3078F DIAST BP <80 MM HG: CPT | Performed by: FAMILY MEDICINE

## 2024-09-11 RX ORDER — AMOXICILLIN 500 MG/1
500 CAPSULE ORAL EVERY 12 HOURS SCHEDULED
Qty: 28 CAPSULE | Refills: 0 | Status: SHIPPED | OUTPATIENT
Start: 2024-09-11 | End: 2024-09-25

## 2024-09-11 ASSESSMENT — ACTIVITIES OF DAILY LIVING (ADL)
BATHING: INDEPENDENT
GROCERY_SHOPPING: INDEPENDENT
DOING_HOUSEWORK: INDEPENDENT
DRESSING: INDEPENDENT
TAKING_MEDICATION: INDEPENDENT
MANAGING_FINANCES: INDEPENDENT

## 2024-09-11 ASSESSMENT — PATIENT HEALTH QUESTIONNAIRE - PHQ9
9. THOUGHTS THAT YOU WOULD BE BETTER OFF DEAD, OR OF HURTING YOURSELF: NOT AT ALL
SUM OF ALL RESPONSES TO PHQ9 QUESTIONS 1 AND 2: 4
7. TROUBLE CONCENTRATING ON THINGS, SUCH AS READING THE NEWSPAPER OR WATCHING TELEVISION: NOT AT ALL
5. POOR APPETITE OR OVEREATING: SEVERAL DAYS
6. FEELING BAD ABOUT YOURSELF - OR THAT YOU ARE A FAILURE OR HAVE LET YOURSELF OR YOUR FAMILY DOWN: NOT AT ALL
3. TROUBLE FALLING OR STAYING ASLEEP OR SLEEPING TOO MUCH: NEARLY EVERY DAY
2. FEELING DOWN, DEPRESSED OR HOPELESS: MORE THAN HALF THE DAYS
4. FEELING TIRED OR HAVING LITTLE ENERGY: NEARLY EVERY DAY
8. MOVING OR SPEAKING SO SLOWLY THAT OTHER PEOPLE COULD HAVE NOTICED. OR THE OPPOSITE, BEING SO FIGETY OR RESTLESS THAT YOU HAVE BEEN MOVING AROUND A LOT MORE THAN USUAL: NOT AT ALL
SUM OF ALL RESPONSES TO PHQ QUESTIONS 1-9: 11
10. IF YOU CHECKED OFF ANY PROBLEMS, HOW DIFFICULT HAVE THESE PROBLEMS MADE IT FOR YOU TO DO YOUR WORK, TAKE CARE OF THINGS AT HOME, OR GET ALONG WITH OTHER PEOPLE: SOMEWHAT DIFFICULT
1. LITTLE INTEREST OR PLEASURE IN DOING THINGS: MORE THAN HALF THE DAYS

## 2024-09-11 NOTE — PROGRESS NOTES
Subjective   Reason for Visit: Anabel Cordova is an 84 y.o. female here for a Medicare Wellness visit.               HPI    Patient Self Assessment of Health Status  Patient Self Assessment: Good    Nutrition and Exercise  Current Diet: Well Balanced Diet  Adequate Fluid Intake: Yes  Caffeine: Yes  Exercise Frequency: Infrequently    Functional Ability/Level of Safety  Cognitive Impairment Observed: No cognitive impairment observed    Home Safety Risk Factors: None         Patient Care Team:  Nathalie Buchanan MD as PCP - General  Nathalie Buchanan MD as PCP - [employer] PCP     Review of Systems  Constitutional: no chills, no fever and no night sweats.   Eyes: no blurred vision and no eyesight problems.   ENT: no hearing loss, no nasal congestion, no nasal discharge, no hoarseness and no sore throat.   Cardiovascular: no chest pain, no intermittent leg claudication, no lower extremity edema, no palpitations and no syncope.   Respiratory: no cough, no shortness of breath during exertion, no shortness of breath at rest and no wheezing.   Gastrointestinal: no abdominal pain, no blood in stools, no constipation, no diarrhea, no melena, no nausea, no rectal pain and no vomiting.   Genitourinary: no dysuria, no change in urinary frequency, no urinary hesitancy, no feelings of urinary urgency and no vaginal discharge.   Musculoskeletal: no arthralgias,  no back pain and no myalgias.   Integumentary: no new skin lesions and no rashes.   Neurological: no difficulty walking, no headache, no limb weakness, no numbness and no tingling.   Psychiatric: no anxiety, no depression, no anhedonia and no substance use disorders.   Endocrine: no recent weight gain and no recent weight loss.   Hematologic/Lymphatic: no tendency for easy bruising and no swollen glands .    Medicare Wellness Visit Billing Compliance Not Met    *This is a visual tool to show completion of required items on the day of the visit.  Green checks will only appear on the date of visit.      Objective   Vitals:  /68   Pulse 62   Temp 36.3 °C (97.3 °F)   Wt 75.3 kg (166 lb)   SpO2 98%   BMI 32.42 kg/m²       Physical Exam  The patient appeared well nourished and normally developed. Vital signs as documented. Head exam is unremarkable. No scleral icterus or corneal arcus noted.  Pupils are equal round reactive to light extraocular movements are intact no hemorrhages noted on funduscopic exam mouth mucous membranes are moist no exudates ears canals clear TMs are gray pearly not injected nose no rhinorrhea or epistaxis Neck is without jugular venous distension, thyromegaly, or carotid bruits. Carotid upstrokes are brisk bilaterally. Lungs are clear to auscultation and percussion. Cardiac exam reveals the PMI to be normally sized and situated. Rhythm is regular. First and second heart sounds normal. No murmurs, rubs or gallops. Abdominal exam reveals normal bowel sounds, no masses, no organomegaly and no aortic enlargement. Extremities are nonedematous and both femoral and pedal pulses are normal.  Neurologic exam DTRs are equal bilaterally no focal deficits strength is symmetrical heme lymph no palpable lymph nodes in the neck axilla or groin    Assessment/Plan   Problem List Items Addressed This Visit       UTI symptoms    Relevant Medications    amoxicillin (Amoxil) 500 mg capsule    Other Relevant Orders    POCT UA Automated manually resulted (Completed)    Urine Culture (Completed)    Medicare annual wellness visit, subsequent - Primary     Other Visit Diagnoses       Encounter for immunization        Relevant Orders    Pneumococcal conjugate vaccine, 20-valent (PREVNAR 20) (Completed)

## 2024-09-14 LAB — BACTERIA UR CULT: ABNORMAL

## 2024-10-23 ENCOUNTER — OFFICE VISIT (OUTPATIENT)
Dept: PRIMARY CARE | Facility: CLINIC | Age: 84
End: 2024-10-23
Payer: MEDICARE

## 2024-10-23 VITALS
BODY MASS INDEX: 32.39 KG/M2 | HEART RATE: 63 BPM | SYSTOLIC BLOOD PRESSURE: 130 MMHG | WEIGHT: 165 LBS | HEIGHT: 60 IN | OXYGEN SATURATION: 95 % | DIASTOLIC BLOOD PRESSURE: 72 MMHG | TEMPERATURE: 97.5 F

## 2024-10-23 DIAGNOSIS — N39.0 RECURRENT UTI: Primary | ICD-10-CM

## 2024-10-23 PROCEDURE — 99213 OFFICE O/P EST LOW 20 MIN: CPT | Performed by: FAMILY MEDICINE

## 2024-10-23 PROCEDURE — 3078F DIAST BP <80 MM HG: CPT | Performed by: FAMILY MEDICINE

## 2024-10-23 PROCEDURE — 1123F ACP DISCUSS/DSCN MKR DOCD: CPT | Performed by: FAMILY MEDICINE

## 2024-10-23 PROCEDURE — 1158F ADVNC CARE PLAN TLK DOCD: CPT | Performed by: FAMILY MEDICINE

## 2024-10-23 PROCEDURE — 1036F TOBACCO NON-USER: CPT | Performed by: FAMILY MEDICINE

## 2024-10-23 PROCEDURE — 1160F RVW MEDS BY RX/DR IN RCRD: CPT | Performed by: FAMILY MEDICINE

## 2024-10-23 PROCEDURE — 1159F MED LIST DOCD IN RCRD: CPT | Performed by: FAMILY MEDICINE

## 2024-10-23 PROCEDURE — 81003 URINALYSIS AUTO W/O SCOPE: CPT | Performed by: FAMILY MEDICINE

## 2024-10-23 PROCEDURE — 3075F SYST BP GE 130 - 139MM HG: CPT | Performed by: FAMILY MEDICINE

## 2024-10-23 NOTE — PROGRESS NOTES
Subjective   Patient ID: Anabel Cordova is a 84 y.o. female who presents for Follow-up (UTI Sx not clearing ).  Persistent UTI symptoms  Has been on amoxicillin cephalexin and Bactrim  Chronic frequent recurrent UTIs still has symptoms        Review of Systems  Constitutional: no chills, no fever and no night sweats.   Eyes: no blurred vision and no eyesight problems.   ENT: no hearing loss, no nasal congestion, no nasal discharge, no hoarseness and no sore throat.   Cardiovascular: no chest pain, no intermittent leg claudication, no lower extremity edema, no palpitations and no syncope.   Respiratory: no cough, no shortness of breath during exertion, no shortness of breath at rest and no wheezing.   Gastrointestinal: no abdominal pain, no blood in stools, no constipation, no diarrhea, no melena, no nausea, no rectal pain and no vomiting.   Genitourinary: no dysuria, no change in urinary frequency, no urinary hesitancy, no feelings of urinary urgency and no vaginal discharge.   Musculoskeletal: no arthralgias,  no back pain and no myalgias.   Integumentary: no new skin lesions and no rashes.   Neurological: no difficulty walking, no headache, no limb weakness, no numbness and no tingling.   Psychiatric: no anxiety, no depression, no anhedonia and no substance use disorders.   Endocrine: no recent weight gain and no recent weight loss.   Hematologic/Lymphatic: no tendency for easy bruising and no swollen glands .    Objective    /72   Pulse 63   Temp 36.4 °C (97.5 °F)   Ht 1.524 m (5')   Wt 74.8 kg (165 lb)   SpO2 95%   BMI 32.22 kg/m²    Physical Exam  The patient appeared well nourished and normally developed. Vital signs as documented. Head exam is unremarkable. No scleral icterus or corneal arcus noted.  Pupils are equal round reactive to light extraocular movements are intact no hemorrhages noted on funduscopic exam mouth mucous membranes are moist no exudates ears canals clear TMs are  gray pearly not injected nose no rhinorrhea or epistaxis Neck is without jugular venous distension, thyromegaly, or carotid bruits. Carotid upstrokes are brisk bilaterally. Lungs are clear to auscultation and percussion. Cardiac exam reveals the PMI to be normally sized and situated. Rhythm is regular. First and second heart sounds normal. No murmurs, rubs or gallops. Abdominal exam reveals normal bowel sounds, no masses, no organomegaly and no aortic enlargement. Extremities are nonedematous and both femoral and pedal pulses are normal.  Neurologic exam DTRs are equal bilaterally no focal deficits strength is symmetrical heme lymph no palpable lymph nodes in the neck axilla or groin    Assessment/Plan   Problem List Items Addressed This Visit       Recurrent UTI - Primary    Relevant Orders    POCT UA Automated manually resulted (Completed)            Nathalie Buchanan MD

## 2024-10-25 ENCOUNTER — APPOINTMENT (OUTPATIENT)
Dept: PRIMARY CARE | Facility: CLINIC | Age: 84
End: 2024-10-25
Payer: MEDICARE

## 2024-10-28 ENCOUNTER — PROCEDURE VISIT (OUTPATIENT)
Dept: PODIATRY | Facility: CLINIC | Age: 84
End: 2024-10-28
Payer: MEDICARE

## 2024-10-28 DIAGNOSIS — M62.81 MUSCLE WEAKNESS (GENERALIZED): ICD-10-CM

## 2024-10-28 DIAGNOSIS — B35.1 ONYCHOMYCOSIS: Primary | ICD-10-CM

## 2024-10-28 DIAGNOSIS — E11.49 TYPE II DIABETES MELLITUS WITH NEUROLOGICAL MANIFESTATIONS (MULTI): ICD-10-CM

## 2024-10-28 DIAGNOSIS — M79.671 FOOT PAIN, BILATERAL: ICD-10-CM

## 2024-10-28 DIAGNOSIS — M79.672 FOOT PAIN, BILATERAL: ICD-10-CM

## 2024-10-28 PROCEDURE — 99213 OFFICE O/P EST LOW 20 MIN: CPT | Performed by: PODIATRIST

## 2024-10-28 ASSESSMENT — ENCOUNTER SYMPTOMS
CONSTITUTIONAL NEGATIVE: 1
HEMATOLOGIC/LYMPHATIC NEGATIVE: 1
RESPIRATORY NEGATIVE: 1
EYES NEGATIVE: 1
GASTROINTESTINAL NEGATIVE: 1
ROS SKIN COMMENTS: NAIL CHANGES
ENDOCRINE NEGATIVE: 1
PSYCHIATRIC NEGATIVE: 1
NUMBNESS: 1
ALLERGIC/IMMUNOLOGIC NEGATIVE: 1

## 2024-10-29 LAB
POC APPEARANCE, URINE: CLEAR
POC BILIRUBIN, URINE: NEGATIVE
POC BLOOD, URINE: NEGATIVE
POC COLOR, URINE: YELLOW
POC GLUCOSE, URINE: NEGATIVE MG/DL
POC KETONES, URINE: NEGATIVE MG/DL
POC LEUKOCYTES, URINE: NEGATIVE
POC NITRITE,URINE: NEGATIVE
POC PH, URINE: 5 PH
POC PROTEIN, URINE: NEGATIVE MG/DL
POC SPECIFIC GRAVITY, URINE: 1.01
POC UROBILINOGEN, URINE: 0.2 EU/DL

## 2024-10-31 PROBLEM — N39.0 RECURRENT UTI: Status: ACTIVE | Noted: 2024-10-31

## 2024-11-13 ENCOUNTER — TELEPHONE (OUTPATIENT)
Dept: PRIMARY CARE | Facility: CLINIC | Age: 84
End: 2024-11-13
Payer: MEDICARE

## 2024-11-13 NOTE — TELEPHONE ENCOUNTER
Melba from  [facility] said that pt is discharged from  Hospital and she wants to know if Dr Buchanan will follow her.

## 2024-11-15 ENCOUNTER — TELEPHONE (OUTPATIENT)
Dept: PRIMARY CARE | Facility: CLINIC | Age: 84
End: 2024-11-15
Payer: MEDICARE

## 2024-11-15 NOTE — TELEPHONE ENCOUNTER
Alethea from [facility] called to let you know she signed up a skilled nurse, PT, and occupational therapy for her.

## 2024-11-19 ENCOUNTER — TELEPHONE (OUTPATIENT)
Dept: PRIMARY CARE | Facility: CLINIC | Age: 84
End: 2024-11-19
Payer: MEDICARE

## 2024-12-12 ENCOUNTER — APPOINTMENT (OUTPATIENT)
Dept: DERMATOLOGY | Facility: CLINIC | Age: 84
End: 2024-12-12
Payer: MEDICARE

## 2024-12-13 ENCOUNTER — TELEPHONE (OUTPATIENT)
Dept: PRIMARY CARE | Facility: CLINIC | Age: 84
End: 2024-12-13
Payer: MEDICARE

## 2024-12-13 NOTE — TELEPHONE ENCOUNTER
Alen called from [facility] and said Anabel was discharged from PT and her goals were all met.

## 2024-12-18 ENCOUNTER — TELEPHONE (OUTPATIENT)
Dept: PRIMARY CARE | Facility: CLINIC | Age: 84
End: 2024-12-18
Payer: MEDICARE

## 2025-01-13 ENCOUNTER — APPOINTMENT (OUTPATIENT)
Dept: PODIATRY | Facility: CLINIC | Age: 85
End: 2025-01-13
Payer: MEDICARE

## 2025-02-28 ENCOUNTER — OFFICE VISIT (OUTPATIENT)
Dept: PRIMARY CARE | Facility: CLINIC | Age: 85
End: 2025-02-28
Payer: MEDICARE

## 2025-02-28 VITALS
DIASTOLIC BLOOD PRESSURE: 78 MMHG | HEIGHT: 60 IN | SYSTOLIC BLOOD PRESSURE: 164 MMHG | BODY MASS INDEX: 31.14 KG/M2 | TEMPERATURE: 98.5 F | HEART RATE: 67 BPM | WEIGHT: 158.6 LBS

## 2025-02-28 DIAGNOSIS — R39.9 UTI SYMPTOMS: ICD-10-CM

## 2025-02-28 LAB
NON-UH HIE A/G RATIO: 1.1
NON-UH HIE ALB: 3.4 G/DL (ref 3.4–5)
NON-UH HIE ALK PHOS: 76 UNIT/L (ref 45–117)
NON-UH HIE BILIRUBIN, TOTAL: 1 MG/DL (ref 0.3–1.2)
NON-UH HIE BUN/CREAT RATIO: 11.2
NON-UH HIE BUN: 9 MG/DL (ref 9–23)
NON-UH HIE CALCIUM: 9.5 MG/DL (ref 8.7–10.4)
NON-UH HIE CALCULATED LDL CHOLESTEROL: 67 MG/DL (ref 60–130)
NON-UH HIE CALCULATED OSMOLALITY: 268 MOSM/KG (ref 275–295)
NON-UH HIE CHLORIDE: 98 MMOL/L (ref 98–107)
NON-UH HIE CHOLESTEROL: 135 MG/DL (ref 100–200)
NON-UH HIE CO2, VENOUS: 29 MMOL/L (ref 20–31)
NON-UH HIE CREATININE, URINE MG/DL: 107.2 MG/DL
NON-UH HIE CREATININE: 0.8 MG/DL (ref 0.5–0.8)
NON-UH HIE DIRECT LDL: 63 MG/DL
NON-UH HIE FREE T4: 1.68 NG/DL (ref 0.89–1.76)
NON-UH HIE GFR AA: >60
NON-UH HIE GLOBULIN: 3.2 G/DL
NON-UH HIE GLOMERULAR FILTRATION RATE: >60 ML/MIN/1.73M?
NON-UH HIE GLUCOSE: 153 MG/DL (ref 74–106)
NON-UH HIE GOT: 19 UNIT/L (ref 15–37)
NON-UH HIE GPT: 8 UNIT/L (ref 10–49)
NON-UH HIE HDL CHOLESTEROL: 50 MG/DL (ref 40–60)
NON-UH HIE HGB A1C: 7.7 %
NON-UH HIE K: 4.3 MMOL/L (ref 3.5–5.1)
NON-UH HIE MICROALBUMIN, URINE MG/L: 6 MG/L
NON-UH HIE MICROALBUMIN/CREATININE RATIO: 6 MG MALB/GM CREAT (ref 0–30)
NON-UH HIE NA: 133 MMOL/L (ref 135–145)
NON-UH HIE TOTAL CHOL/HDL CHOL RATIO: 2.7
NON-UH HIE TOTAL PROTEIN: 6.7 G/DL (ref 5.7–8.2)
NON-UH HIE TRIGLYCERIDES: 90 MG/DL (ref 30–150)
NON-UH HIE TSH: 1.57 UIU/ML (ref 0.55–4.78)
NON-UH HIE VIT D 25: 111 NG/ML
POC APPEARANCE, URINE: CLEAR
POC BILIRUBIN, URINE: NEGATIVE
POC BLOOD, URINE: NEGATIVE
POC COLOR, URINE: YELLOW
POC GLUCOSE, URINE: NEGATIVE MG/DL
POC KETONES, URINE: NEGATIVE MG/DL
POC LEUKOCYTES, URINE: ABNORMAL
POC NITRITE,URINE: NEGATIVE
POC PH, URINE: 7 PH
POC PROTEIN, URINE: NEGATIVE MG/DL
POC SPECIFIC GRAVITY, URINE: 1.01
POC UROBILINOGEN, URINE: 0.2 EU/DL

## 2025-02-28 PROCEDURE — 1159F MED LIST DOCD IN RCRD: CPT | Performed by: FAMILY MEDICINE

## 2025-02-28 PROCEDURE — 3077F SYST BP >= 140 MM HG: CPT | Performed by: FAMILY MEDICINE

## 2025-02-28 PROCEDURE — 1036F TOBACCO NON-USER: CPT | Performed by: FAMILY MEDICINE

## 2025-02-28 PROCEDURE — 99213 OFFICE O/P EST LOW 20 MIN: CPT | Performed by: FAMILY MEDICINE

## 2025-02-28 PROCEDURE — 81003 URINALYSIS AUTO W/O SCOPE: CPT | Performed by: FAMILY MEDICINE

## 2025-02-28 PROCEDURE — 1160F RVW MEDS BY RX/DR IN RCRD: CPT | Performed by: FAMILY MEDICINE

## 2025-02-28 PROCEDURE — 1123F ACP DISCUSS/DSCN MKR DOCD: CPT | Performed by: FAMILY MEDICINE

## 2025-02-28 PROCEDURE — 3078F DIAST BP <80 MM HG: CPT | Performed by: FAMILY MEDICINE

## 2025-02-28 RX ORDER — GABAPENTIN 100 MG/1
2 CAPSULE ORAL
COMMUNITY
Start: 2024-12-13

## 2025-02-28 RX ORDER — NITROFURANTOIN 25; 75 MG/1; MG/1
1 CAPSULE ORAL
COMMUNITY
Start: 2025-01-21

## 2025-02-28 ASSESSMENT — PATIENT HEALTH QUESTIONNAIRE - PHQ9
SUM OF ALL RESPONSES TO PHQ9 QUESTIONS 1 AND 2: 0
2. FEELING DOWN, DEPRESSED OR HOPELESS: NOT AT ALL
1. LITTLE INTEREST OR PLEASURE IN DOING THINGS: NOT AT ALL

## 2025-02-28 NOTE — PROGRESS NOTES
Subjective   Patient ID: Anabel Cordova is a 85 y.o. female who presents for Follow-up (Last week had headache, fatigue, and stomach pain.  ).  HPI    Review of Systems  Constitutional: no chills, no fever and no night sweats.   Eyes: no blurred vision and no eyesight problems.   ENT: no hearing loss, no nasal congestion, no nasal discharge, no hoarseness and no sore throat.   Cardiovascular: no chest pain, no intermittent leg claudication, no lower extremity edema, no palpitations and no syncope.   Respiratory: no cough, no shortness of breath during exertion, no shortness of breath at rest and no wheezing.   Gastrointestinal: no abdominal pain, no blood in stools, no constipation, no diarrhea, no melena, no nausea, no rectal pain and no vomiting.   Genitourinary: no dysuria, no change in urinary frequency, no urinary hesitancy, no feelings of urinary urgency and no vaginal discharge.   Musculoskeletal: no arthralgias,  no back pain and no myalgias.   Integumentary: no new skin lesions and no rashes.   Neurological: no difficulty walking, no headache, no limb weakness, no numbness and no tingling.   Psychiatric: no anxiety, no depression, no anhedonia and no substance use disorders.   Endocrine: no recent weight gain and no recent weight loss.   Hematologic/Lymphatic: no tendency for easy bruising and no swollen glands .    Objective    /78   Pulse 67   Temp 36.9 °C (98.5 °F)   Ht 1.524 m (5')   Wt 71.9 kg (158 lb 9.6 oz)   BMI 30.97 kg/m²    Physical Exam  The patient appeared well nourished and normally developed. Vital signs as documented. Head exam is unremarkable. No scleral icterus or corneal arcus noted.  Pupils are equal round reactive to light extraocular movements are intact no hemorrhages noted on funduscopic exam mouth mucous membranes are moist no exudates ears canals clear TMs are gray pearly not injected nose no rhinorrhea or epistaxis Neck is without jugular venous distension,  thyromegaly, or carotid bruits. Carotid upstrokes are brisk bilaterally. Lungs are clear to auscultation and percussion. Cardiac exam reveals the PMI to be normally sized and situated. Rhythm is regular. First and second heart sounds normal. No murmurs, rubs or gallops. Abdominal exam reveals normal bowel sounds, no masses, no organomegaly and no aortic enlargement. Extremities are nonedematous and both femoral and pedal pulses are normal.  Neurologic exam DTRs are equal bilaterally no focal deficits strength is symmetrical heme lymph no palpable lymph nodes in the neck axilla or groin    Assessment/Plan   Problem List Items Addressed This Visit       UTI symptoms    Relevant Orders    POCT UA Automated manually resulted (Completed)    Urine Culture            Nathalie Buchanan MD

## 2025-03-02 LAB — BACTERIA UR CULT: NORMAL

## 2025-03-03 ENCOUNTER — APPOINTMENT (OUTPATIENT)
Dept: PODIATRY | Facility: CLINIC | Age: 85
End: 2025-03-03
Payer: MEDICARE

## 2025-03-03 DIAGNOSIS — R39.9 UTI SYMPTOMS: Primary | ICD-10-CM

## 2025-03-03 RX ORDER — NITROFURANTOIN 25; 75 MG/1; MG/1
100 CAPSULE ORAL 2 TIMES DAILY
Qty: 14 CAPSULE | Refills: 0 | Status: SHIPPED | OUTPATIENT
Start: 2025-03-03 | End: 2025-03-10

## 2025-03-10 ENCOUNTER — OFFICE VISIT (OUTPATIENT)
Dept: PODIATRY | Facility: CLINIC | Age: 85
End: 2025-03-10
Payer: MEDICARE

## 2025-03-10 ENCOUNTER — APPOINTMENT (OUTPATIENT)
Dept: PODIATRY | Facility: CLINIC | Age: 85
End: 2025-03-10
Payer: MEDICARE

## 2025-03-10 DIAGNOSIS — M79.671 FOOT PAIN, BILATERAL: Primary | ICD-10-CM

## 2025-03-10 DIAGNOSIS — M79.672 FOOT PAIN, BILATERAL: Primary | ICD-10-CM

## 2025-03-10 DIAGNOSIS — E11.42 DIABETIC POLYNEUROPATHY ASSOCIATED WITH TYPE 2 DIABETES MELLITUS (MULTI): ICD-10-CM

## 2025-03-10 DIAGNOSIS — E11.49 TYPE II DIABETES MELLITUS WITH NEUROLOGICAL MANIFESTATIONS (MULTI): ICD-10-CM

## 2025-03-10 DIAGNOSIS — M62.81 MUSCLE WEAKNESS (GENERALIZED): ICD-10-CM

## 2025-03-10 DIAGNOSIS — B35.1 ONYCHOMYCOSIS: ICD-10-CM

## 2025-03-10 PROCEDURE — 1036F TOBACCO NON-USER: CPT | Performed by: PODIATRIST

## 2025-03-10 PROCEDURE — 99213 OFFICE O/P EST LOW 20 MIN: CPT | Performed by: PODIATRIST

## 2025-03-10 PROCEDURE — 1159F MED LIST DOCD IN RCRD: CPT | Performed by: PODIATRIST

## 2025-03-10 PROCEDURE — 1160F RVW MEDS BY RX/DR IN RCRD: CPT | Performed by: PODIATRIST

## 2025-03-10 PROCEDURE — 1123F ACP DISCUSS/DSCN MKR DOCD: CPT | Performed by: PODIATRIST

## 2025-03-10 RX ORDER — LIDOCAINE 50 MG/G
1 PATCH TOPICAL DAILY
Qty: 30 PATCH | Refills: 1 | Status: SHIPPED | OUTPATIENT
Start: 2025-03-10

## 2025-03-10 ASSESSMENT — ENCOUNTER SYMPTOMS
ENDOCRINE NEGATIVE: 1
ALLERGIC/IMMUNOLOGIC NEGATIVE: 1
EYES NEGATIVE: 1
PSYCHIATRIC NEGATIVE: 1
ROS SKIN COMMENTS: NAIL CHANGES
RESPIRATORY NEGATIVE: 1
CONSTITUTIONAL NEGATIVE: 1
HEMATOLOGIC/LYMPHATIC NEGATIVE: 1
GASTROINTESTINAL NEGATIVE: 1
NUMBNESS: 1

## 2025-03-10 NOTE — PROGRESS NOTES
Chief Complaint   Patient presents with    Follow-up     FOOT CARE     \  Chief Complaint   Patient presents with    Follow-up     FOOT CARE   Follows up for diabetic foot check.  Does complain of diabetic nerve pain this is especially uncomfortable at night.  No problems during the day.  Endocrinology placed her on gabapentin however this made her tired so gabapentin at night and Cymbalta during the day.  For diabetic foot check.  Chief complaint painful nails in both feet.  Neuropathy is bothersome.  She had taken gabapentin without much success in the past.  No other complaints.  No other changes past medical history.  Last A1c was a proximal   7%.  Review of Systems   Constitutional: Negative.    Eyes: Negative.    Respiratory: Negative.     Cardiovascular:  Positive for leg swelling.   Gastrointestinal: Negative.    Endocrine: Negative.    Genitourinary: Negative.    Musculoskeletal:  Positive for gait problem.   Skin:         Nail changes   Allergic/Immunologic: Negative.    Neurological:  Positive for numbness.   Hematological: Negative.    Psychiatric/Behavioral: Negative.         General/Constitutional: Alert. NAD.   Respiratory: Non labored breathing.   Psychiatric: Mood and affect normal/baseline.   HEENT: Sclera clear. Wearing corrective lenses.  Dermatologic: Continues with dystrophic nails on both feet.  Hypertrophy noted.  Subungual debris appreciated.  Painful to palpation. No acute inflammatory infectious process. Web spaces are dry. No ulcers no pre-ulcerative areas.  Vascular: Pedal pulses are intact and symmetric including the dorsalis pedis and the posterior tibial pulses. Feet are warm to touch.  Right lower extremity moderate nonpitting edema.  Multiple varicosities right lower leg.  Leg with history of DVT.   Neurological: Alert and oriented. No acute distress.  Gross sensation intact.  Monofilament testing diminished both feet.  Musculoskeletal: Mild symmetric decrease in strength both lower  extremities.  In wheelchair today.    Impression: Painful onychomycosis.  Venous stasis.  Varicosities.  High risk for infection because complicated by diabetes with neuropathy.  Diabetic nerve pain    -Nail debridement was performed this was a greater than 6 nails. Nails were manually debrided.  They were decreased and girth in length. Appropriate care was discussed. Preventative care was reviewed. Follow-up in about 2 months unless there is any other problems.    -Endocrinology has placed her on Cymbalta as well as gabapentin.    -Encourage you to wear compression socks especially on the right lower leg that would be helpful.    -Prescribed lidocaine patch.  Use per label instructions.    -No new labs to review    -Medicine note 2/28/2025 reviewed.

## 2025-04-09 ENCOUNTER — OFFICE VISIT (OUTPATIENT)
Dept: OBSTETRICS AND GYNECOLOGY | Facility: CLINIC | Age: 85
End: 2025-04-09
Payer: MEDICARE

## 2025-04-09 VITALS
HEART RATE: 70 BPM | BODY MASS INDEX: 31.69 KG/M2 | WEIGHT: 161.4 LBS | HEIGHT: 60 IN | SYSTOLIC BLOOD PRESSURE: 116 MMHG | DIASTOLIC BLOOD PRESSURE: 66 MMHG

## 2025-04-09 DIAGNOSIS — N95.2 VAGINAL ATROPHY: ICD-10-CM

## 2025-04-09 DIAGNOSIS — N39.0 RECURRENT UTI: Primary | ICD-10-CM

## 2025-04-09 LAB
POC APPEARANCE, URINE: CLEAR
POC BILIRUBIN, URINE: NEGATIVE
POC BLOOD, URINE: NEGATIVE
POC COLOR, URINE: YELLOW
POC GLUCOSE, URINE: NEGATIVE MG/DL
POC KETONES, URINE: NEGATIVE MG/DL
POC LEUKOCYTES, URINE: ABNORMAL
POC NITRITE,URINE: NEGATIVE
POC PH, URINE: 5.5 PH
POC PROTEIN, URINE: NEGATIVE MG/DL
POC SPECIFIC GRAVITY, URINE: 1.02
POC UROBILINOGEN, URINE: 0.2 EU/DL

## 2025-04-09 PROCEDURE — 81003 URINALYSIS AUTO W/O SCOPE: CPT | Performed by: NURSE PRACTITIONER

## 2025-04-09 PROCEDURE — 3074F SYST BP LT 130 MM HG: CPT | Performed by: NURSE PRACTITIONER

## 2025-04-09 PROCEDURE — 1159F MED LIST DOCD IN RCRD: CPT | Performed by: NURSE PRACTITIONER

## 2025-04-09 PROCEDURE — 51798 US URINE CAPACITY MEASURE: CPT | Performed by: NURSE PRACTITIONER

## 2025-04-09 PROCEDURE — 99213 OFFICE O/P EST LOW 20 MIN: CPT | Mod: 25 | Performed by: NURSE PRACTITIONER

## 2025-04-09 PROCEDURE — 1126F AMNT PAIN NOTED NONE PRSNT: CPT | Performed by: NURSE PRACTITIONER

## 2025-04-09 PROCEDURE — 3078F DIAST BP <80 MM HG: CPT | Performed by: NURSE PRACTITIONER

## 2025-04-09 PROCEDURE — 99203 OFFICE O/P NEW LOW 30 MIN: CPT | Performed by: NURSE PRACTITIONER

## 2025-04-09 PROCEDURE — 1123F ACP DISCUSS/DSCN MKR DOCD: CPT | Performed by: NURSE PRACTITIONER

## 2025-04-09 RX ORDER — METHENAMINE HIPPURATE 1000 MG/1
1 TABLET ORAL 2 TIMES DAILY
Qty: 60 TABLET | Refills: 11 | Status: SHIPPED | OUTPATIENT
Start: 2025-04-09 | End: 2026-04-09

## 2025-04-09 RX ORDER — DULOXETIN HYDROCHLORIDE 20 MG/1
20 CAPSULE, DELAYED RELEASE ORAL
COMMUNITY

## 2025-04-09 ASSESSMENT — ENCOUNTER SYMPTOMS
PSYCHIATRIC NEGATIVE: 1
HEMATOLOGIC/LYMPHATIC NEGATIVE: 1
MUSCULOSKELETAL NEGATIVE: 1
ALLERGIC/IMMUNOLOGIC NEGATIVE: 1
ENDOCRINE NEGATIVE: 1
CONSTITUTIONAL NEGATIVE: 1
RESPIRATORY NEGATIVE: 1
CARDIOVASCULAR NEGATIVE: 1
EYES NEGATIVE: 1
NEUROLOGICAL NEGATIVE: 1
GASTROINTESTINAL NEGATIVE: 1

## 2025-04-09 ASSESSMENT — PAIN SCALES - GENERAL: PAINLEVEL_OUTOF10: 0-NO PAIN

## 2025-04-09 NOTE — PROGRESS NOTES
Referring Physician: No ref. provider found     General medical background: None    Obstetric/Gynecologic History:  Anabel Cordova has a history of : 6. Para: 6. There is No history of  section(s).     Past Evaluation and Treatment::  Past evaluation includes: urine culture urinalysis  Past treatment includes: Medical Therapy antibiotics trimethoprim; keflex; macrobid    Review of Systems   Constitutional: Negative.    HENT: Negative.     Eyes: Negative.    Respiratory: Negative.     Cardiovascular: Negative.    Gastrointestinal: Negative.    Endocrine: Negative.    Genitourinary: Negative.    Musculoskeletal: Negative.    Skin: Negative.    Allergic/Immunologic: Negative.    Neurological: Negative.    Hematological: Negative.    Psychiatric/Behavioral: Negative.          85 y.o. female with c/o Godwin presents as a new patient. Her daughter (Rosi) is accompanying her. Patient currently sees an infectious disease provider at [facility]. Reports a hx of Godwin since  and has been on a maintenance antibiotic for a couple of years. Initially, the medication was effective, but she started to build resistance, particularly to Trimethoprim. Hx of tx with Keflex and Macrobid. Describes a burning sensation with urination only when having a UTI. Wakes up several times at night to void, which is more frequent when having a UTI. Experiences urinary leakage, which is not associated with UTI. Denies hx of kidney stones. Her mother had a hx of Godwin, and her brother has kidney issues. Unsure if she has had a renal US. Notes it has been years since she has had a pelvic examination. Hx of tv estrogen use, but is currently not using it. Concerned about the estrogen cream seeping out when getting up at night to void. Denies hx of bladder surgery. Believes she had a bladder US over 5 years ago.       Urinary Incontinence Questions:  Looking back, how long do you think that you have been affected by your  urinary complaints? Several years  Have you seen a physician or medical provider for this? Yes  Have you ever been prescribed any medication for this? No  Have you ever taken any medication for this? No  How many different medications have you tried or been prescribed? 0  How frustrated are you with your bladder symptoms? Moderately frustrated  What are your goals of therapy? Goal 1: manage rUTIs    Testing results:  PVR results are available and reviewed  : 0 ml  UA results available and reviewed  Laboratory:   Urine dipstick shows negative.      History:  Urge Symptoms:   Some women receive very little warning and suddenly find that they are losing, or about to lose, urine beyond their control. How often does this happen to you? 2 - Sometimes  Do you have frequent bladder infections? 3 - Often  How often do you urinate at night? 3 - Often        Physical Exam  Constitutional:       Appearance: Normal appearance.   Genitourinary:      Bladder and urethral meatus normal.      No vaginal tenderness.      No urethral stress urinary incontinence with cough stress test present.      Bladder is not tender.       Pelvic exam was performed with patient in the lithotomy position.   HENT:      Head: Normocephalic and atraumatic.   Neurological:      General: No focal deficit present.      Mental Status: She is alert and oriented to person, place, and time.   Psychiatric:         Mood and Affect: Mood normal.         Behavior: Behavior normal.         Thought Content: Thought content normal.         Judgment: Judgment normal.          Assessment and Plan  85 y.o. female with Godwin. Comorbidities include: atrial fibrillation, HLD, HTN, CAD, T2DM, obesity, GERD, OA.     Diagnosis List:  #1 Godwin    Plan:  1. Godwin  - POCT UA automated manually resulted.  - Post void residual measured.   - Discontinue Trimethoprim.   - Start Hiprex 1 g tablet, take 1 tablet (1 g) by mouth 2 times a day.   - Urine culture standing order requisition  sent.   - Renal US requisition sent for further evaluation.   - Consider cystoscopy with Valentina Mejía MD for further evaluation if symptoms do not improve.  - Discussed use of tv estrogen cream or Estring, patient declines at this time.   - Discussed that utilizing tv estrogen acts as UTI prophylaxis in postmenopausal women by making the vaginal pH more acidic and providing more blood flow to vaginal tissue/changes the vasculature therefore preventing bacteria from colonizing. We reassured her that using tv estrogen cream has a localized effect to the vaginal tissue and is not a form of HRT such as po estrogen which produces a systemic effect. The risk of using vaginal estrogen cream in correlation with putting patients at greater risk for developing GYN cancer is extremely low as the estrogen acts locally and is not absorbed systemically.     Follow up in 3 months with GUZMAN Singleton.      Shantaibe Attestation  By signing my name below, I, Bel Pop, attest that this documentation has been prepared under the direction and in the presence of GUZMAN Singleton on 04/09/2025 at 6:56 PM.     GUZMAN Singleton

## 2025-04-14 ENCOUNTER — APPOINTMENT (OUTPATIENT)
Dept: OBSTETRICS AND GYNECOLOGY | Facility: CLINIC | Age: 85
End: 2025-04-14
Payer: MEDICARE

## 2025-04-18 DIAGNOSIS — N39.0 RECURRENT UTI: Primary | ICD-10-CM

## 2025-04-18 RX ORDER — NITROFURANTOIN 25; 75 MG/1; MG/1
100 CAPSULE ORAL 2 TIMES DAILY
Qty: 14 CAPSULE | Refills: 0 | Status: SHIPPED | OUTPATIENT
Start: 2025-04-18 | End: 2025-04-25

## 2025-04-20 LAB — BACTERIA UR CULT: NORMAL

## 2025-04-22 ENCOUNTER — TELEPHONE (OUTPATIENT)
Dept: OBSTETRICS AND GYNECOLOGY | Facility: CLINIC | Age: 85
End: 2025-04-22
Payer: MEDICARE

## 2025-04-22 NOTE — TELEPHONE ENCOUNTER
Spoke to patient daughter answered all questions within scope. Forwarded further questions to Kiara.

## 2025-04-22 NOTE — TELEPHONE ENCOUNTER
----- Message from Kiara Lawler sent at 4/21/2025 10:50 AM EDT -----  Not a true bacterial UTI.    ----- Message -----  From: Lincoln Cheney MA  Sent: 4/9/2025   2:16 PM EDT  To: Kiara Lawler, RUPERT-CNP

## 2025-04-25 ENCOUNTER — TELEPHONE (OUTPATIENT)
Dept: OBSTETRICS AND GYNECOLOGY | Facility: CLINIC | Age: 85
End: 2025-04-25
Payer: MEDICARE

## 2025-04-25 NOTE — TELEPHONE ENCOUNTER
Daughter wanted to clarify if pt should restart hiprex after finishing the Macrobid - yes. Since the last test showed gential john with bacteria, offered a [employer] home kit to resample if her symptoms don't resolve by 3 days after last dose of atb. She is agreeable to this, and request sent to [facility].

## 2025-04-28 ENCOUNTER — HOSPITAL ENCOUNTER (OUTPATIENT)
Dept: RADIOLOGY | Facility: HOSPITAL | Age: 85
Discharge: HOME | End: 2025-04-28
Payer: MEDICARE

## 2025-04-28 DIAGNOSIS — N39.0 RECURRENT UTI: ICD-10-CM

## 2025-04-28 PROCEDURE — 76770 US EXAM ABDO BACK WALL COMP: CPT

## 2025-04-28 PROCEDURE — 76770 US EXAM ABDO BACK WALL COMP: CPT | Performed by: RADIOLOGY

## 2025-04-30 DIAGNOSIS — N28.89 RENAL MASS, LEFT: Primary | ICD-10-CM

## 2025-04-30 NOTE — RESULT ENCOUNTER NOTE
Called and left her daughter, Rosi, a brief message that I was calling regarding results. No kidney stones.  However, the left kidney has a solid mass.   Needs a referral to urology to further address.

## 2025-05-02 ENCOUNTER — PATIENT MESSAGE (OUTPATIENT)
Dept: OBSTETRICS AND GYNECOLOGY | Facility: CLINIC | Age: 85
End: 2025-05-02
Payer: MEDICARE

## 2025-05-02 DIAGNOSIS — N39.0 RECURRENT UTI: ICD-10-CM

## 2025-05-02 DIAGNOSIS — N28.89 RENAL MASS, LEFT: Primary | ICD-10-CM

## 2025-05-05 ENCOUNTER — TELEPHONE (OUTPATIENT)
Dept: OBSTETRICS AND GYNECOLOGY | Facility: CLINIC | Age: 85
End: 2025-05-05
Payer: MEDICARE

## 2025-05-07 ENCOUNTER — TELEPHONE (OUTPATIENT)
Dept: OBSTETRICS AND GYNECOLOGY | Facility: CLINIC | Age: 85
End: 2025-05-07

## 2025-05-07 RX ORDER — AMPICILLIN 500 MG/1
500 CAPSULE ORAL EVERY 6 HOURS SCHEDULED
Qty: 28 CAPSULE | Refills: 0 | Status: SHIPPED | OUTPATIENT
Start: 2025-05-07 | End: 2025-05-14

## 2025-05-07 NOTE — TELEPHONE ENCOUNTER
Pt daughter called and asked if someone can give  her a call today in regards of previous messages. Stated she now have new symptoms

## 2025-05-07 NOTE — PROGRESS NOTES
Guidance UTI results received, will treat UTI with Ampicillin 500 mg QID x 7 days.   Kiara Lawler, APRN-CNP

## 2025-05-19 ENCOUNTER — APPOINTMENT (OUTPATIENT)
Dept: PODIATRY | Facility: CLINIC | Age: 85
End: 2025-05-19
Payer: MEDICARE

## 2025-05-21 LAB
NON-UH HIE A/G RATIO: 1.1
NON-UH HIE ALB: 3.4 G/DL (ref 3.4–5)
NON-UH HIE ALK PHOS: 76 UNIT/L (ref 45–117)
NON-UH HIE BILIRUBIN, TOTAL: 0.4 MG/DL (ref 0.3–1.2)
NON-UH HIE BUN/CREAT RATIO: 18.6
NON-UH HIE BUN: 13 MG/DL (ref 9–23)
NON-UH HIE CALCIUM: 9.9 MG/DL (ref 8.7–10.4)
NON-UH HIE CALCULATED OSMOLALITY: 280 MOSM/KG (ref 275–295)
NON-UH HIE CHLORIDE: 102 MMOL/L (ref 98–107)
NON-UH HIE CO2, VENOUS: 30 MMOL/L (ref 20–31)
NON-UH HIE CREATININE: 0.7 MG/DL (ref 0.5–0.8)
NON-UH HIE FOLATE: >48 NG/ML (ref 5.4–17.5)
NON-UH HIE GFR AA: >60
NON-UH HIE GLOBULIN: 3 G/DL
NON-UH HIE GLOMERULAR FILTRATION RATE: >60 ML/MIN/1.73M?
NON-UH HIE GLUCOSE: 105 MG/DL (ref 74–106)
NON-UH HIE GOT: 24 UNIT/L (ref 15–37)
NON-UH HIE GPT: 10 UNIT/L (ref 10–49)
NON-UH HIE HGB A1C: 7.5 %
NON-UH HIE K: 4 MMOL/L (ref 3.5–5.1)
NON-UH HIE NA: 140 MMOL/L (ref 135–145)
NON-UH HIE TOTAL PROTEIN: 6.5 G/DL (ref 5.7–8.2)
NON-UH HIE TSH: 0.25 UIU/ML (ref 0.55–4.78)
NON-UH HIE URIC ACID: 3.1 MG/DL (ref 3.1–7.8)
NON-UH HIE VIT D 25: 92 NG/ML
NON-UH HIE VITAMIN B12: 587 PG/ML (ref 211–911)

## 2025-05-29 ENCOUNTER — APPOINTMENT (OUTPATIENT)
Dept: OBSTETRICS AND GYNECOLOGY | Facility: CLINIC | Age: 85
End: 2025-05-29
Payer: MEDICARE

## 2025-05-30 DIAGNOSIS — N39.0 RECURRENT UTI: ICD-10-CM

## 2025-06-06 ENCOUNTER — APPOINTMENT (OUTPATIENT)
Dept: RADIOLOGY | Facility: HOSPITAL | Age: 85
End: 2025-06-06
Payer: MEDICARE

## 2025-06-06 DIAGNOSIS — N28.89 RENAL MASS, LEFT: ICD-10-CM

## 2025-06-06 PROCEDURE — 74183 MRI ABD W/O CNTR FLWD CNTR: CPT

## 2025-06-06 PROCEDURE — A9575 INJ GADOTERATE MEGLUMI 0.1ML: HCPCS | Performed by: NURSE PRACTITIONER

## 2025-06-06 PROCEDURE — 2550000001 HC RX 255 CONTRASTS: Performed by: NURSE PRACTITIONER

## 2025-06-06 RX ORDER — GADOTERATE MEGLUMINE 376.9 MG/ML
14 INJECTION INTRAVENOUS
Status: COMPLETED | OUTPATIENT
Start: 2025-06-06 | End: 2025-06-06

## 2025-06-06 RX ADMIN — GADOTERATE MEGLUMINE 14 ML: 376.9 INJECTION INTRAVENOUS at 14:36

## 2025-06-16 ENCOUNTER — PROCEDURE VISIT (OUTPATIENT)
Dept: PODIATRY | Facility: CLINIC | Age: 85
End: 2025-06-16
Payer: MEDICARE

## 2025-06-16 DIAGNOSIS — B35.1 ONYCHOMYCOSIS: ICD-10-CM

## 2025-06-16 DIAGNOSIS — E11.49 TYPE II DIABETES MELLITUS WITH NEUROLOGICAL MANIFESTATIONS (MULTI): ICD-10-CM

## 2025-06-16 DIAGNOSIS — M79.671 FOOT PAIN, BILATERAL: Primary | ICD-10-CM

## 2025-06-16 DIAGNOSIS — M62.81 MUSCLE WEAKNESS (GENERALIZED): ICD-10-CM

## 2025-06-16 DIAGNOSIS — E11.42 DIABETIC POLYNEUROPATHY ASSOCIATED WITH TYPE 2 DIABETES MELLITUS: ICD-10-CM

## 2025-06-16 DIAGNOSIS — M79.672 FOOT PAIN, BILATERAL: Primary | ICD-10-CM

## 2025-06-16 PROCEDURE — 99213 OFFICE O/P EST LOW 20 MIN: CPT | Performed by: PODIATRIST

## 2025-06-16 ASSESSMENT — ENCOUNTER SYMPTOMS
ALLERGIC/IMMUNOLOGIC NEGATIVE: 1
GASTROINTESTINAL NEGATIVE: 1
HEMATOLOGIC/LYMPHATIC NEGATIVE: 1
EYES NEGATIVE: 1
ENDOCRINE NEGATIVE: 1
ROS SKIN COMMENTS: NAIL CHANGES
NUMBNESS: 1
RESPIRATORY NEGATIVE: 1
CONSTITUTIONAL NEGATIVE: 1
PSYCHIATRIC NEGATIVE: 1

## 2025-06-16 NOTE — PROGRESS NOTES
Chief Complaint   Patient presents with    DM Foot Care     NAIL CARE   Diabetic female follows up with her daughter for diabetic foot check and nail evaluation.  She admits this multiple painful nails on both feet.  Nails are thickened.  Subungual debris.  She did recently see endocrinology her A1c was about 7.  She is taking her Cymbalta and taking gabapentin.  Perhaps some help with her nocturnal pain. During the day she generally does okay.  Daughter asks about slip on shoes.  No other complaints.  No other changes past medical history.    Review of Systems   Constitutional: Negative.    Eyes: Negative.    Respiratory: Negative.     Cardiovascular:  Positive for leg swelling.   Gastrointestinal: Negative.    Endocrine: Negative.    Genitourinary: Negative.    Musculoskeletal:  Positive for gait problem.   Skin:         Nail changes   Allergic/Immunologic: Negative.    Neurological:  Positive for numbness.   Hematological: Negative.    Psychiatric/Behavioral: Negative.         General/Constitutional: Alert. NAD.   Respiratory: Non labored breathing.   Psychiatric: Mood and affect normal/baseline.   Dermatologic: Continues with dystrophic nails on both feet.  Hypertrophy noted.  Subungual debris appreciated.  This is baseline.  Painful to palpation. No acute inflammatory infectious process. Web spaces are dry. No ulcers no pre-ulcerative areas.  Vascular: Pulses are palpable but diminished.  Increased edema with the extremities.  Nonpitting.  Neurological: Alert and oriented. No acute distress.  Gross sensation intact.  Monofilament testing diminished both feet.  Musculoskeletal: Mild symmetric decrease in strength both lower extremities.  This is baseline.  Under lapping secon third digit right foot.  Using wheelchair.    Impression: Diabetic female with painful onychomycosis.  Venous stasis.  Varicosities.  High risk for infection because complicated by diabetes with neuropathy.  Diabetic nerve pain    -Nail  debridement was performed this was a greater than 6 nails. Nails were manually debrided.  They were decreased and girth in length. Appropriate care was discussed. Preventative care was reviewed. Follow-up in about 2 months unless there is any other problems.    - Continue glucose control.  Continue with your endocrinologist.    -Encourage you to wear compression socks especially on the right lower leg that would be helpful.    -No new labs to review    - No new internal medicine notes reviewed.    -Advised daughter to slip on shoes.  I would avoid shoes without a closed heel.

## 2025-06-17 ENCOUNTER — APPOINTMENT (OUTPATIENT)
Dept: PODIATRY | Facility: CLINIC | Age: 85
End: 2025-06-17
Payer: MEDICARE

## 2025-06-20 ENCOUNTER — APPOINTMENT (OUTPATIENT)
Age: 85
End: 2025-06-20
Payer: MEDICARE

## 2025-06-27 DIAGNOSIS — N39.0 RECURRENT UTI: ICD-10-CM

## 2025-07-14 ENCOUNTER — OFFICE VISIT (OUTPATIENT)
Dept: OBSTETRICS AND GYNECOLOGY | Facility: CLINIC | Age: 85
End: 2025-07-14
Payer: MEDICARE

## 2025-07-14 VITALS
WEIGHT: 160.4 LBS | SYSTOLIC BLOOD PRESSURE: 144 MMHG | DIASTOLIC BLOOD PRESSURE: 65 MMHG | BODY MASS INDEX: 31.49 KG/M2 | HEART RATE: 76 BPM | HEIGHT: 60 IN

## 2025-07-14 DIAGNOSIS — N39.0 RECURRENT UTI: Primary | ICD-10-CM

## 2025-07-14 LAB
POC APPEARANCE, URINE: CLEAR
POC BILIRUBIN, URINE: NEGATIVE
POC BLOOD, URINE: NEGATIVE
POC COLOR, URINE: ABNORMAL
POC GLUCOSE, URINE: NEGATIVE MG/DL
POC KETONES, URINE: NEGATIVE MG/DL
POC LEUKOCYTES, URINE: ABNORMAL
POC NITRITE,URINE: NEGATIVE
POC PH, URINE: 6 PH
POC PROTEIN, URINE: NEGATIVE MG/DL
POC SPECIFIC GRAVITY, URINE: 1.01
POC UROBILINOGEN, URINE: 0.2 EU/DL

## 2025-07-14 PROCEDURE — 99212 OFFICE O/P EST SF 10 MIN: CPT | Performed by: NURSE PRACTITIONER

## 2025-07-14 PROCEDURE — 81003 URINALYSIS AUTO W/O SCOPE: CPT | Performed by: NURSE PRACTITIONER

## 2025-07-14 PROCEDURE — 3078F DIAST BP <80 MM HG: CPT | Performed by: NURSE PRACTITIONER

## 2025-07-14 PROCEDURE — 1159F MED LIST DOCD IN RCRD: CPT | Performed by: NURSE PRACTITIONER

## 2025-07-14 PROCEDURE — 3077F SYST BP >= 140 MM HG: CPT | Performed by: NURSE PRACTITIONER

## 2025-07-14 ASSESSMENT — ENCOUNTER SYMPTOMS
EYES NEGATIVE: 1
CARDIOVASCULAR NEGATIVE: 1
CONSTITUTIONAL NEGATIVE: 1
NEUROLOGICAL NEGATIVE: 1
PSYCHIATRIC NEGATIVE: 1
GASTROINTESTINAL NEGATIVE: 1
ALLERGIC/IMMUNOLOGIC NEGATIVE: 1
HEMATOLOGIC/LYMPHATIC NEGATIVE: 1
ENDOCRINE NEGATIVE: 1
RESPIRATORY NEGATIVE: 1
MUSCULOSKELETAL NEGATIVE: 1

## 2025-07-14 NOTE — PROGRESS NOTES
Subjective   Anabel Cordova is a 85 y.o. female presenting for Godwin FUV. Reports she has not had any UTI sx since her last visit. Continues to take Hiprex. Mentions she recently experienced hair loss, inquires about the association to Hiprex. She recently started taking Cymbalta.     Review of Systems   Constitutional: Negative.    HENT: Negative.     Eyes: Negative.    Respiratory: Negative.     Cardiovascular: Negative.    Gastrointestinal: Negative.    Endocrine: Negative.    Genitourinary: Negative.    Musculoskeletal: Negative.    Skin: Negative.    Allergic/Immunologic: Negative.    Neurological: Negative.    Hematological: Negative.    Psychiatric/Behavioral: Negative.         Objective   /65   Pulse 76   Ht (!) 1.524 m (5')   Wt 72.8 kg (160 lb 6.4 oz)   BMI 31.33 kg/m²    General: alert and oriented, in no acute distress   Abdomen:    Back:      Laboratory:   Urine dipstick shows negative.      Assessment/Plan   85 y.o. female with Godwin. Comorbidities include: HLD, HTN, T2DM, obesity, GERD.    Diagnosis List:  #1 Godwin    Plan:  1. Godwin  - POCT UA automated manually resulted.  - Continue Hiprex as prescribed. Plan to continue regimen for 6-12 months and continue monitoring for any side effect or recurrence of symptoms.   - Advised that hair loss may be associated to Cymbalta, recommend discussing with her prescribing provider.   - Follow up in Urogynecology.     Follow up in 1 year with GUZMAN Singleton.      Scribe Attestation  By signing my name below, IMildred Scribe, attest that this documentation has been prepared under the direction and in the presence of GUZMAN Singleton on 07/14/2025 at 5:40 PM.     SPEKE audio duration: 8 minutes    I spent a total of 15 minutes in face to face and non face to face time.   Agree with above. I Kiara EMERY personally performed the services described in the documentation which was scribed virtually and  confirm it is both complete and accurate.  Kiara EMERY

## 2025-07-25 DIAGNOSIS — N39.0 RECURRENT UTI: ICD-10-CM

## 2025-07-28 ENCOUNTER — OFFICE VISIT (OUTPATIENT)
Dept: URGENT CARE | Age: 85
End: 2025-07-28
Payer: MEDICARE

## 2025-07-28 VITALS
HEART RATE: 64 BPM | TEMPERATURE: 97.2 F | RESPIRATION RATE: 16 BRPM | DIASTOLIC BLOOD PRESSURE: 64 MMHG | SYSTOLIC BLOOD PRESSURE: 156 MMHG | OXYGEN SATURATION: 97 %

## 2025-07-28 DIAGNOSIS — L30.9 DERMATITIS: Primary | ICD-10-CM

## 2025-07-28 PROCEDURE — 3078F DIAST BP <80 MM HG: CPT | Performed by: STUDENT IN AN ORGANIZED HEALTH CARE EDUCATION/TRAINING PROGRAM

## 2025-07-28 PROCEDURE — 1036F TOBACCO NON-USER: CPT | Performed by: STUDENT IN AN ORGANIZED HEALTH CARE EDUCATION/TRAINING PROGRAM

## 2025-07-28 PROCEDURE — 99203 OFFICE O/P NEW LOW 30 MIN: CPT | Performed by: STUDENT IN AN ORGANIZED HEALTH CARE EDUCATION/TRAINING PROGRAM

## 2025-07-28 PROCEDURE — 3077F SYST BP >= 140 MM HG: CPT | Performed by: STUDENT IN AN ORGANIZED HEALTH CARE EDUCATION/TRAINING PROGRAM

## 2025-07-28 PROCEDURE — 1126F AMNT PAIN NOTED NONE PRSNT: CPT | Performed by: STUDENT IN AN ORGANIZED HEALTH CARE EDUCATION/TRAINING PROGRAM

## 2025-07-28 PROCEDURE — 1159F MED LIST DOCD IN RCRD: CPT | Performed by: STUDENT IN AN ORGANIZED HEALTH CARE EDUCATION/TRAINING PROGRAM

## 2025-07-28 RX ORDER — VALACYCLOVIR HYDROCHLORIDE 1 G/1
1000 TABLET, FILM COATED ORAL 3 TIMES DAILY
Qty: 21 TABLET | Refills: 0 | Status: SHIPPED | OUTPATIENT
Start: 2025-07-28 | End: 2025-08-04

## 2025-07-28 ASSESSMENT — PAIN SCALES - GENERAL: PAINLEVEL_OUTOF10: 0-NO PAIN

## 2025-07-28 NOTE — PROGRESS NOTES
Subjective   Patient ID: Anabel Cordova is a 85 y.o. female. They present today with a chief complaint of Rash (Rash on buttock X 1 wk. ).    History of Present Illness    Rash      Patient presents to urgent care in presence of daughter for a chief complaint of rash over buttocks/gluteal cleft for approximately 1 week patient states did have burning and dry peeling prior to development of rash no application of ointments or creams  Past Medical History  Allergies as of 07/28/2025 - Reviewed 07/28/2025   Allergen Reaction Noted    Ciprofloxacin Unknown 03/24/2023    Iodinated contrast media Other 03/24/2023    Metformin Unknown 03/24/2023    Rosiglitazone Unknown 03/24/2023    Sulfa (sulfonamide antibiotics) Unknown 03/24/2023    Sulfamethoxazole-trimethoprim Unknown 03/24/2023       Prescriptions Prior to Admission[1]     Medical History[2]    Surgical History[3]     reports that she has never smoked. She has never used smokeless tobacco. She reports that she does not drink alcohol and does not use drugs.    Review of Systems  Review of Systems   Skin:  Positive for rash.                                  Objective    Vitals:    07/28/25 1121   BP: 156/64   Pulse: 64   Resp: 16   Temp: 36.2 °C (97.2 °F)   SpO2: 97%     No LMP recorded. Patient is postmenopausal.    Physical Exam  Vitals and nursing note reviewed.   Constitutional:       General: She is not in acute distress.     Appearance: Normal appearance. She is not ill-appearing, toxic-appearing or diaphoretic.     Cardiovascular:      Rate and Rhythm: Normal rate.   Pulmonary:      Effort: Pulmonary effort is normal. No respiratory distress.     Skin:     Findings: Lesion and rash present.      Comments: Female nurse present in room along with daughter upon examination of gluteal cleft region there is the presence of several vesicular lesions in linear distribution with erythematous base although more towards the left side there are several lesions  encompassing the right of gluteal cleft line     Neurological:      General: No focal deficit present.      Mental Status: She is alert and oriented to person, place, and time.     Psychiatric:         Mood and Affect: Mood normal.         Behavior: Behavior normal.         Procedures    Point of Care Test & Imaging Results from this visit  No results found for this visit on 07/28/25.   Imaging  No results found.    Cardiology, Vascular, and Other Imaging  No other imaging results found for the past 2 days      Diagnostic study results (if any) were reviewed by Torsten Bernard PA-C.    Assessment/Plan   Allergies, medications, history, and pertinent labs/EKGs/Imaging reviewed by Torsten Bernard PA-C.     Medical Decision Making  As patient does have history of shingles, itchy burning prodrome and presentation I did discuss with patient and daughter I do suspect shingles although past hallmark timeframe due to risk of postherpetic neuralgia we will start patient on valacyclovir, may follow-up with primary care discharge emergent care A+O x 4 stable condition no signs of distress neurovascular intact    Orders and Diagnoses  Diagnoses and all orders for this visit:  Dermatitis  -     valACYclovir (Valtrex) 1 gram tablet; Take 1 tablet (1,000 mg) by mouth 3 times a day for 7 days.      Medical Admin Record      Patient disposition: Home    Electronically signed by Torsten Bernard PA-C  11:57 AM           [1] (Not in a hospital admission)   [2]   Past Medical History:  Diagnosis Date    Cramp and spasm 08/29/2016    Muscle cramps    Dysuria 11/04/2019    Encounter for screening for malignant neoplasm of colon 03/29/2021    Colon cancer screening    Insect bite (nonvenomous) of unspecified part of head, initial encounter (CODE) 06/13/2020    Tick bite of head, initial encounter    Other general symptoms and signs 10/24/2020    Flu-like symptoms    Other specified postprocedural states     History of endoscopy     Pain in unspecified hip 08/16/2017    Hip pain    Pain in unspecified knee 04/27/2018    Knee pain    Pain in unspecified knee 08/16/2017    Knee pain    Personal history of other endocrine, nutritional and metabolic disease     History of thyroid nodule    Personal history of other infectious and parasitic diseases 11/04/2019    Personal history of other specified conditions 11/17/2017    History of dizziness    Unspecified multiple injuries, initial encounter 05/10/2017    Multiple contusions   [3]   Past Surgical History:  Procedure Laterality Date    OTHER SURGICAL HISTORY  10/21/2014    Thyroid Surgery Sub-Total Thyroidectomy    OTHER SURGICAL HISTORY  02/17/2020    Cardiac catheterization    OTHER SURGICAL HISTORY  02/17/2020    Knee replacement

## 2025-07-29 ENCOUNTER — TELEPHONE (OUTPATIENT)
Dept: URGENT CARE | Age: 85
End: 2025-07-29

## 2025-08-18 ENCOUNTER — APPOINTMENT (OUTPATIENT)
Dept: PODIATRY | Facility: CLINIC | Age: 85
End: 2025-08-18
Payer: MEDICARE

## 2025-08-18 DIAGNOSIS — E11.49 TYPE II DIABETES MELLITUS WITH NEUROLOGICAL MANIFESTATIONS (MULTI): ICD-10-CM

## 2025-08-18 DIAGNOSIS — M79.671 FOOT PAIN, BILATERAL: Primary | ICD-10-CM

## 2025-08-18 DIAGNOSIS — M79.672 FOOT PAIN, BILATERAL: Primary | ICD-10-CM

## 2025-08-18 DIAGNOSIS — L60.0 ONYCHOCRYPTOSIS: ICD-10-CM

## 2025-08-18 DIAGNOSIS — E11.42 DIABETIC POLYNEUROPATHY ASSOCIATED WITH TYPE 2 DIABETES MELLITUS: ICD-10-CM

## 2025-08-18 DIAGNOSIS — B35.1 ONYCHOMYCOSIS: ICD-10-CM

## 2025-08-18 PROCEDURE — 99213 OFFICE O/P EST LOW 20 MIN: CPT | Performed by: PODIATRIST

## 2025-08-18 ASSESSMENT — ENCOUNTER SYMPTOMS
ENDOCRINE NEGATIVE: 1
GASTROINTESTINAL NEGATIVE: 1
NUMBNESS: 1
CONSTITUTIONAL NEGATIVE: 1
ROS SKIN COMMENTS: NAIL CHANGES
PSYCHIATRIC NEGATIVE: 1
HEMATOLOGIC/LYMPHATIC NEGATIVE: 1
EYES NEGATIVE: 1
RESPIRATORY NEGATIVE: 1
ALLERGIC/IMMUNOLOGIC NEGATIVE: 1

## 2025-08-22 DIAGNOSIS — N39.0 RECURRENT UTI: ICD-10-CM

## 2025-08-23 LAB
NON-UH HIE A/G RATIO: 1.1
NON-UH HIE ALB: 3.5 G/DL (ref 3.4–5)
NON-UH HIE ALK PHOS: 70 UNIT/L (ref 45–117)
NON-UH HIE BILIRUBIN, TOTAL: 0.7 MG/DL (ref 0.3–1.2)
NON-UH HIE BUN/CREAT RATIO: 15
NON-UH HIE BUN: 12 MG/DL (ref 9–23)
NON-UH HIE CALCIUM: 9.6 MG/DL (ref 8.7–10.4)
NON-UH HIE CALCULATED LDL CHOLESTEROL: 60 MG/DL (ref 60–130)
NON-UH HIE CALCULATED OSMOLALITY: 273 MOSM/KG (ref 275–295)
NON-UH HIE CHLORIDE: 98 MMOL/L (ref 98–107)
NON-UH HIE CHOLESTEROL: 121 MG/DL (ref 100–200)
NON-UH HIE CO2, VENOUS: 29 MMOL/L (ref 20–31)
NON-UH HIE CREATININE: 0.8 MG/DL (ref 0.5–0.8)
NON-UH HIE DIRECT LDL: 60 MG/DL
NON-UH HIE GFR AA: >60
NON-UH HIE GLOBULIN: 3 G/DL
NON-UH HIE GLOMERULAR FILTRATION RATE: >60 ML/MIN/1.73M?
NON-UH HIE GLUCOSE: 160 MG/DL (ref 74–106)
NON-UH HIE GOT: 20 UNIT/L (ref 15–37)
NON-UH HIE GPT: 9 UNIT/L (ref 10–49)
NON-UH HIE HDL CHOLESTEROL: 40 MG/DL (ref 40–60)
NON-UH HIE HGB A1C: 8 %
NON-UH HIE K: 3.8 MMOL/L (ref 3.5–5.1)
NON-UH HIE NA: 135 MMOL/L (ref 135–145)
NON-UH HIE T3, TOTAL: 84 NG/DL (ref 60–181)
NON-UH HIE THYROXINE: 9.4 UG/DL (ref 4.5–10.9)
NON-UH HIE TOTAL CHOL/HDL CHOL RATIO: 3
NON-UH HIE TOTAL PROTEIN: 6.5 G/DL (ref 5.7–8.2)
NON-UH HIE TRIGLYCERIDES: 106 MG/DL (ref 30–150)
NON-UH HIE TSH: 0.53 UIU/ML (ref 0.55–4.78)
NON-UH HIE VIT D 25: 80 NG/ML